# Patient Record
Sex: MALE | Race: WHITE | NOT HISPANIC OR LATINO | Employment: FULL TIME | URBAN - NONMETROPOLITAN AREA
[De-identification: names, ages, dates, MRNs, and addresses within clinical notes are randomized per-mention and may not be internally consistent; named-entity substitution may affect disease eponyms.]

---

## 2017-01-30 ENCOUNTER — OFFICE VISIT (OUTPATIENT)
Dept: FAMILY MEDICINE CLINIC | Facility: CLINIC | Age: 62
End: 2017-01-30

## 2017-01-30 VITALS
HEART RATE: 91 BPM | SYSTOLIC BLOOD PRESSURE: 146 MMHG | WEIGHT: 194 LBS | BODY MASS INDEX: 29.4 KG/M2 | DIASTOLIC BLOOD PRESSURE: 82 MMHG | HEIGHT: 68 IN | TEMPERATURE: 98.8 F

## 2017-01-30 DIAGNOSIS — L98.9 SKIN LESION OF LEFT LOWER EXTREMITY: ICD-10-CM

## 2017-01-30 DIAGNOSIS — J01.00 ACUTE NON-RECURRENT MAXILLARY SINUSITIS: Primary | ICD-10-CM

## 2017-01-30 PROCEDURE — 17110 DESTRUCTION B9 LES UP TO 14: CPT | Performed by: FAMILY MEDICINE

## 2017-01-30 PROCEDURE — 99212 OFFICE O/P EST SF 10 MIN: CPT | Performed by: FAMILY MEDICINE

## 2017-01-30 PROCEDURE — 96372 THER/PROPH/DIAG INJ SC/IM: CPT | Performed by: FAMILY MEDICINE

## 2017-01-30 RX ORDER — TRIAMCINOLONE ACETONIDE 40 MG/ML
40 INJECTION, SUSPENSION INTRA-ARTICULAR; INTRAMUSCULAR ONCE
Status: COMPLETED | OUTPATIENT
Start: 2017-01-30 | End: 2017-01-30

## 2017-01-30 RX ADMIN — TRIAMCINOLONE ACETONIDE 40 MG: 40 INJECTION, SUSPENSION INTRA-ARTICULAR; INTRAMUSCULAR at 13:49

## 2017-01-30 NOTE — PROGRESS NOTES
"Subjective   Chief Complaint   Patient presents with   • Cough     productive, sneezing     Lul Garica is a 61 y.o. male.   Cough (productive, sneezing)    Cough   This is a new problem. The current episode started in the past 7 days. The problem has been gradually worsening. The problem occurs constantly. The cough is productive of sputum. Associated symptoms include headaches, nasal congestion, postnasal drip, rhinorrhea and a sore throat. Pertinent negatives include no ear congestion, ear pain or shortness of breath. The symptoms are aggravated by lying down. He has tried nothing for the symptoms.        The following portions of the patient's history were reviewed and updated as appropriate: allergies, current medications, past family history, past medical history, past social history, past surgical history and problem list.    Review of Systems   HENT: Positive for postnasal drip, rhinorrhea and sore throat. Negative for ear pain.    Respiratory: Positive for cough. Negative for shortness of breath.    Neurological: Positive for headaches.       Objective   Visit Vitals   • /82   • Pulse 91   • Temp 98.8 °F (37.1 °C)   • Ht 68\" (172.7 cm)   • Wt 194 lb (88 kg)   • BMI 29.5 kg/m2     Physical Exam   Constitutional: He is oriented to person, place, and time. He appears well-developed and well-nourished.   HENT:   Head: Normocephalic and atraumatic.   Right Ear: External ear normal.   Left Ear: External ear normal.   Nose: Rhinorrhea present. Right sinus exhibits maxillary sinus tenderness. Right sinus exhibits no frontal sinus tenderness. Left sinus exhibits maxillary sinus tenderness. Left sinus exhibits no frontal sinus tenderness.   Mouth/Throat: Posterior oropharyngeal erythema present.   Eyes: Pupils are equal, round, and reactive to light.   Neck: Normal range of motion. Neck supple.   Cardiovascular: Normal rate, regular rhythm and normal heart sounds.    Pulmonary/Chest: Effort normal and " breath sounds normal.   Neurological: He is alert and oriented to person, place, and time.   Skin: Skin is warm and dry.   Skin lesion on the left patella   Psychiatric: He has a normal mood and affect.   Nursing note and vitals reviewed.      Assessment/Plan   Problems Addressed this Visit     None      Visit Diagnoses     Acute non-recurrent maxillary sinusitis    -  Primary    Relevant Medications    triamcinolone acetonide (KENALOG-40) injection 40 mg (Start on 1/30/2017  2:15 PM)    Skin lesion of left lower extremity            Kenalog IM today    Cryotherapy of left knee skin lesion  Treated twice with good coverage  Patient tolerated procedure well    Recheck as needed

## 2017-01-30 NOTE — MR AVS SNAPSHOT
Lul Garcia   1/30/2017 1:15 PM   Office Visit    Dept Phone:  [phone]   Encounter #:  [id number]    Provider:  Lashanda Olvera MD   Department:  [facility]                Your Full Care Plan              Your Updated Medication List          This list is accurate as of: 1/30/17  1:42 PM.  Always use your most recent med list.                levETIRAcetam 500 MG tablet   Commonly known as:  KEPPRA       lisinopril 20 MG tablet   Commonly known as:  PRINIVIL,ZESTRIL   Take 1 tablet by mouth daily.       OMEGA 3 PO       omeprazole 20 MG capsule   Commonly known as:  priLOSEC       pravastatin 20 MG tablet   Commonly known as:  PRAVACHOL   TAKE ONE TABLET BY MOUTH ONCE DAILY AT BEDTIME               You Were Diagnosed With        Codes Comments    Acute non-recurrent maxillary sinusitis    -  Primary ICD-10-CM: J01.00  ICD-9-CM: 461.0       Medications to be Given to You by a Medical Professional     Due       Frequency    1/30/2017 triamcinolone acetonide (KENALOG-40) injection 40 mg  Once      Instructions     None    Patient Instructions History      Upcoming Appointments     Visit Type Date Time Department    OFFICE VISIT 1/30/2017  1:15 PM [facility]    FOLLOW UP 6/14/2017  3:00 PM [facility]      MyChart Signup     Our records indicate that you have declined [facility]. If you would like to sign up for [facility], please email [email or web address] or call [phone] to obtain an activation code.             Other Info from Your Visit           Your Appointments     Jun 14, 2017  3:00 PM CDT   Follow Up with Lashanda Olvera MD   [facility] [place] (--)    [address] [place] [address] [address] [place] [place] [address]   [phone]           Arrive 15 minutes prior to appointment.              Allergies     No Known Allergies      Reason for Visit     Cough productive, sneezing       "  Vital Signs     Blood Pressure Pulse Temperature Height Weight Body Mass Index    146/82 91 98.8 °F (37.1 °C) 68\" (172.7 cm) 194 lb (88 kg) 29.5 kg/m2    Smoking Status                   Former Smoker           Problems and Diagnoses Noted     Acute non-recurrent maxillary sinusitis    -  Primary        "

## 2017-02-01 ENCOUNTER — TELEPHONE (OUTPATIENT)
Dept: FAMILY MEDICINE CLINIC | Facility: CLINIC | Age: 62
End: 2017-02-01

## 2017-02-01 RX ORDER — BENZONATATE 100 MG/1
100 CAPSULE ORAL 3 TIMES DAILY PRN
Qty: 30 CAPSULE | Refills: 0 | Status: SHIPPED | OUTPATIENT
Start: 2017-02-01 | End: 2017-06-14

## 2017-02-01 NOTE — TELEPHONE ENCOUNTER
----- Message from Lashanda Olvera MD sent at 2/1/2017 12:34 PM CST -----  Sent in script.  ----- Message -----     From: Gabrielle Olvera MA     Sent: 2/1/2017  10:35 AM       To: Lashanda Olvera MD    Please advise  ----- Message -----     From: Nehal Chapin     Sent: 2/1/2017  10:13 AM       To: Gabrielle Olvera MA    Patient says he is still coughing. Wants to know if there is something else he can take or does he need to come in? His number is [phone]

## 2017-02-03 RX ORDER — GUAIFENESIN AND CODEINE PHOSPHATE 100; 10 MG/5ML; MG/5ML
10 SOLUTION ORAL 4 TIMES DAILY PRN
Qty: 236 ML | Refills: 0 | Status: SHIPPED | OUTPATIENT
Start: 2017-02-03 | End: 2017-06-14

## 2017-03-25 ENCOUNTER — HOSPITAL ENCOUNTER (EMERGENCY)
Facility: HOSPITAL | Age: 62
Discharge: HOME | End: 2017-03-25
Attending: EMERGENCY MEDICINE | Admitting: EMERGENCY MEDICINE

## 2017-03-25 ENCOUNTER — APPOINTMENT (OUTPATIENT)
Dept: GENERAL RADIOLOGY | Facility: HOSPITAL | Age: 62
End: 2017-03-25

## 2017-03-25 VITALS
SYSTOLIC BLOOD PRESSURE: 135 MMHG | TEMPERATURE: 99.4 F | DIASTOLIC BLOOD PRESSURE: 82 MMHG | RESPIRATION RATE: 20 BRPM | WEIGHT: 188 LBS | BODY MASS INDEX: 28.49 KG/M2 | OXYGEN SATURATION: 92 % | HEIGHT: 68 IN | HEART RATE: 106 BPM

## 2017-03-25 DIAGNOSIS — K52.9 GASTROENTERITIS: Primary | ICD-10-CM

## 2017-03-25 LAB
ALBUMIN SERPL-MCNC: 4.6 G/DL (ref 3.4–4.8)
ALBUMIN/GLOB SERPL: 1.4 G/DL (ref 1.1–1.8)
ALP SERPL-CCNC: 124 U/L (ref 38–126)
ALT SERPL W P-5'-P-CCNC: 22 U/L (ref 21–72)
ANION GAP SERPL CALCULATED.3IONS-SCNC: 13 MMOL/L (ref 5–15)
AST SERPL-CCNC: 22 U/L (ref 17–59)
BACTERIA UR QL AUTO: ABNORMAL /HPF
BASOPHILS # BLD AUTO: 0.01 10*3/MM3 (ref 0–0.2)
BASOPHILS NFR BLD AUTO: 0.1 % (ref 0–2)
BILIRUB SERPL-MCNC: 0.5 MG/DL (ref 0.2–1.3)
BILIRUB UR QL STRIP: ABNORMAL
BUN BLD-MCNC: 12 MG/DL (ref 7–21)
BUN/CREAT SERPL: 9 (ref 7–25)
CALCIUM SPEC-SCNC: 9.1 MG/DL (ref 8.4–10.2)
CHLORIDE SERPL-SCNC: 107 MMOL/L (ref 95–110)
CLARITY UR: CLEAR
CO2 SERPL-SCNC: 26 MMOL/L (ref 22–31)
COLOR UR: ABNORMAL
CREAT BLD-MCNC: 1.33 MG/DL (ref 0.7–1.3)
D-LACTATE SERPL-SCNC: 1.4 MMOL/L (ref 0.5–2)
DEPRECATED RDW RBC AUTO: 41.8 FL (ref 35.1–43.9)
EOSINOPHIL # BLD AUTO: 0.06 10*3/MM3 (ref 0–0.7)
EOSINOPHIL NFR BLD AUTO: 0.4 % (ref 0–7)
ERYTHROCYTE [DISTWIDTH] IN BLOOD BY AUTOMATED COUNT: 13.6 % (ref 11.5–14.5)
GFR SERPL CREATININE-BSD FRML MDRD: 55 ML/MIN/1.73 (ref 49–113)
GLOBULIN UR ELPH-MCNC: 3.2 GM/DL (ref 2.3–3.5)
GLUCOSE BLD-MCNC: 122 MG/DL (ref 60–100)
GLUCOSE UR STRIP-MCNC: NEGATIVE MG/DL
HCT VFR BLD AUTO: 46.5 % (ref 39–49)
HGB BLD-MCNC: 16.8 G/DL (ref 13.7–17.3)
HGB UR QL STRIP.AUTO: NEGATIVE
HOLD SPECIMEN: NORMAL
HOLD SPECIMEN: NORMAL
HYALINE CASTS UR QL AUTO: ABNORMAL /LPF
IMM GRANULOCYTES # BLD: 0.05 10*3/MM3 (ref 0–0.02)
IMM GRANULOCYTES NFR BLD: 0.4 % (ref 0–0.5)
KETONES UR QL STRIP: ABNORMAL
LEUKOCYTE ESTERASE UR QL STRIP.AUTO: NEGATIVE
LIPASE SERPL-CCNC: 85 U/L (ref 23–300)
LYMPHOCYTES # BLD AUTO: 0.35 10*3/MM3 (ref 0.6–4.2)
LYMPHOCYTES NFR BLD AUTO: 2.6 % (ref 10–50)
MCH RBC QN AUTO: 30.8 PG (ref 26.5–34)
MCHC RBC AUTO-ENTMCNC: 36.1 G/DL (ref 31.5–36.3)
MCV RBC AUTO: 85.3 FL (ref 80–98)
MONOCYTES # BLD AUTO: 0.51 10*3/MM3 (ref 0–0.9)
MONOCYTES NFR BLD AUTO: 3.7 % (ref 0–12)
MUCOUS THREADS URNS QL MICRO: ABNORMAL /HPF
NEUTROPHILS # BLD AUTO: 12.7 10*3/MM3 (ref 2–8.6)
NEUTROPHILS NFR BLD AUTO: 92.8 % (ref 37–80)
NITRITE UR QL STRIP: NEGATIVE
PH UR STRIP.AUTO: 5.5 [PH] (ref 5–9)
PLATELET # BLD AUTO: 168 10*3/MM3 (ref 150–450)
PMV BLD AUTO: 10.3 FL (ref 8–12)
POTASSIUM BLD-SCNC: 4.7 MMOL/L (ref 3.5–5.1)
PROT SERPL-MCNC: 7.8 G/DL (ref 6.3–8.6)
PROT UR QL STRIP: ABNORMAL
RBC # BLD AUTO: 5.45 10*6/MM3 (ref 4.37–5.74)
RBC # UR: ABNORMAL /HPF
REF LAB TEST METHOD: ABNORMAL
SODIUM BLD-SCNC: 146 MMOL/L (ref 137–145)
SP GR UR STRIP: 1.03 (ref 1–1.03)
SQUAMOUS #/AREA URNS HPF: ABNORMAL /HPF
UROBILINOGEN UR QL STRIP: ABNORMAL
WBC NRBC COR # BLD: 13.68 10*3/MM3 (ref 3.2–9.8)
WBC UR QL AUTO: ABNORMAL /HPF
WHOLE BLOOD HOLD SPECIMEN: NORMAL
WHOLE BLOOD HOLD SPECIMEN: NORMAL

## 2017-03-25 PROCEDURE — 74020 HC XR ABDOMEN FLAT & UPRIGHT: CPT

## 2017-03-25 PROCEDURE — 96361 HYDRATE IV INFUSION ADD-ON: CPT

## 2017-03-25 PROCEDURE — 83690 ASSAY OF LIPASE: CPT | Performed by: EMERGENCY MEDICINE

## 2017-03-25 PROCEDURE — 81001 URINALYSIS AUTO W/SCOPE: CPT | Performed by: EMERGENCY MEDICINE

## 2017-03-25 PROCEDURE — 87086 URINE CULTURE/COLONY COUNT: CPT | Performed by: EMERGENCY MEDICINE

## 2017-03-25 PROCEDURE — 80053 COMPREHEN METABOLIC PANEL: CPT | Performed by: EMERGENCY MEDICINE

## 2017-03-25 PROCEDURE — 99284 EMERGENCY DEPT VISIT MOD MDM: CPT

## 2017-03-25 PROCEDURE — 85025 COMPLETE CBC W/AUTO DIFF WBC: CPT | Performed by: EMERGENCY MEDICINE

## 2017-03-25 PROCEDURE — 83605 ASSAY OF LACTIC ACID: CPT | Performed by: EMERGENCY MEDICINE

## 2017-03-25 PROCEDURE — 96360 HYDRATION IV INFUSION INIT: CPT

## 2017-03-25 RX ORDER — SODIUM CHLORIDE 0.9 % (FLUSH) 0.9 %
10 SYRINGE (ML) INJECTION AS NEEDED
Status: DISCONTINUED | OUTPATIENT
Start: 2017-03-25 | End: 2017-03-25 | Stop reason: HOSPADM

## 2017-03-25 RX ORDER — SODIUM CHLORIDE 9 MG/ML
500 INJECTION, SOLUTION INTRAVENOUS ONCE
Status: COMPLETED | OUTPATIENT
Start: 2017-03-25 | End: 2017-03-25

## 2017-03-25 RX ORDER — ONDANSETRON 4 MG/1
4 TABLET, ORALLY DISINTEGRATING ORAL EVERY 6 HOURS PRN
Qty: 12 TABLET | Refills: 0 | Status: SHIPPED | OUTPATIENT
Start: 2017-03-25 | End: 2017-06-14

## 2017-03-25 RX ADMIN — SODIUM CHLORIDE 1000 ML: 9 INJECTION, SOLUTION INTRAVENOUS at 04:50

## 2017-03-25 RX ADMIN — SODIUM CHLORIDE 500 ML: 9 INJECTION, SOLUTION INTRAVENOUS at 02:58

## 2017-03-25 RX ADMIN — SODIUM CHLORIDE 500 ML: 9 INJECTION, SOLUTION INTRAVENOUS at 07:28

## 2017-03-25 NOTE — DISCHARGE INSTRUCTIONS
Followup with your doctor in three days if not better.  Return with any new or worsening symptoms, or any concerns.

## 2017-03-25 NOTE — ED PROVIDER NOTES
Subjective   HPI Comments: Patient with new onset abdominal discomfort, cramping, copious diarrhea 'all night', and emesis x 1.  Patient relates this to eating at [employer] in [place] today.  Patient does have a hx of bowel obstruction but no symptoms of the same today.  No f/c.  No recent illnesses.      Patient is a 61 y.o. male presenting with abdominal pain.   History provided by:  Patient   used: No    Abdominal Pain   Pain location:  Generalized  Pain quality: aching and cramping    Pain radiates to:  Does not radiate  Pain severity:  Mild  Onset quality:  Gradual  Duration:  8 hours  Timing:  Intermittent  Progression:  Waxing and waning  Chronicity:  New  Context: suspicious food intake    Relieved by:  Vomiting  Associated symptoms: anorexia, diarrhea, nausea and vomiting    Associated symptoms: no belching, no chest pain, no chills, no constipation, no cough, no dysuria, no fatigue, no fever, no flatus, no hematemesis, no hematuria, no melena, no shortness of breath and no sore throat        Review of Systems   Constitutional: Negative.  Negative for appetite change, chills, fatigue and fever.   HENT: Negative.  Negative for congestion and sore throat.    Eyes: Negative.  Negative for photophobia and visual disturbance.   Respiratory: Negative.  Negative for cough, chest tightness and shortness of breath.    Cardiovascular: Negative.  Negative for chest pain and palpitations.   Gastrointestinal: Positive for abdominal pain, anorexia, diarrhea, nausea and vomiting. Negative for constipation, flatus, hematemesis and melena.   Endocrine: Negative.    Genitourinary: Negative.  Negative for decreased urine volume, dysuria, flank pain and hematuria.   Musculoskeletal: Negative.  Negative for arthralgias, back pain, myalgias, neck pain and neck stiffness.   Skin: Negative.  Negative for pallor.   Neurological: Negative.  Negative for dizziness, syncope, weakness, light-headedness, numbness  "and headaches.   Psychiatric/Behavioral: Negative.  Negative for confusion and suicidal ideas. The patient is not nervous/anxious.    All other systems reviewed and are negative.      Past Medical History:   Diagnosis Date   • Acute hepatitis     Acute hepatitis - resolved      • Anemia     Anemia, unspecified      • Blood clots in brain    • Body mass index (BMI) of 28.0-28.9 in adult    • Body mass index (BMI) of 30.0-30.9 in adult    • Dizziness     Dizziness - resolving      • GERD (gastroesophageal reflux disease)    • Hypercholesterolemia    • Hypertensive disorder    • Regional enteritis of small intestine    • Simple partial seizure with autonomic dysfunction        No Known Allergies    Past Surgical History:   Procedure Laterality Date   • APPENDECTOMY     • BRAIN SURGERY      x4, last surgery was in 1999.   • CARDIAC CATHETERIZATION     • CHOLECYSTECTOMY     • COLON RESECTION     • ENDOSCOPY AND COLONOSCOPY       Internal & external hemorrhoids found. 5/29/2013      • ENDOSCOPY W/ PEG TUBE PLACEMENT      Normal esophagus. Gastritis in stomach. Biopsy taken. Normal duodenum. Biopsy taken. 5/29/2013       • HYDROCELE EXCISION / REPAIR      Normal esophagus. Gastritis in stomach. Biopsy taken. Normal duodenum. Biopsy taken. 5/29/2013       • INJECTION OF MEDICATION      Celestone (betamethasone) (1)      02/04/2016    • INJECTION OF MEDICATION      Depo Medrol (Methylprednisone) 80mg (1)      09/30/2013       • NASAL SEPTUM SURGERY         Family History   Problem Relation Age of Onset   • Cancer Father    • Other Father      Hematologic disorder   • Leukemia Father    • Diabetes Brother        Social History     Social History   • Marital status:      Spouse name: N/A   • Number of children: N/A   • Years of education: N/A     Social History Main Topics   • Smoking status: Former Smoker     Types: Cigarettes   • Smokeless tobacco: Never Used      Comment: Quit smoking about 26 years ago\".   • Alcohol " "use No      Comment: \"Former Beer drinker, Quit about 26 years ago\"   • Drug use: No   • Sexual activity: Not Asked     Other Topics Concern   • None     Social History Narrative           Objective   Physical Exam   Constitutional: He is oriented to person, place, and time. He appears well-developed and well-nourished. No distress.   HENT:   Head: Normocephalic and atraumatic.   Eyes: Conjunctivae and EOM are normal.   Neck: Normal range of motion. Neck supple. No JVD present.   Cardiovascular: Normal rate, regular rhythm, normal heart sounds and intact distal pulses.  Exam reveals no gallop and no friction rub.    No murmur heard.  Pulmonary/Chest: Effort normal. No respiratory distress. He has no wheezes. He has no rales. He exhibits no tenderness.   Abdominal: Soft. Bowel sounds are normal. He exhibits no distension and no mass. There is no tenderness. There is no rebound and no guarding.   Musculoskeletal: Normal range of motion.   Neurological: He is alert and oriented to person, place, and time.   Skin: Skin is warm and dry.   Psychiatric: He has a normal mood and affect. His behavior is normal. Judgment and thought content normal.   Nursing note and vitals reviewed.      Procedures         ED Course  ED Course        Labs Reviewed   COMPREHENSIVE METABOLIC PANEL - Abnormal; Notable for the following:        Result Value    Glucose 122 (*)     Creatinine 1.33 (*)     Sodium 146 (*)     All other components within normal limits   URINALYSIS W/ CULTURE IF INDICATED - Abnormal; Notable for the following:     Specific Gravity, UA 1.031 (*)     Ketones, UA Trace (*)     Bilirubin, UA Small (1+) (*)     Protein, UA 30 mg/dL (1+) (*)     All other components within normal limits   CBC WITH AUTO DIFFERENTIAL - Abnormal; Notable for the following:     WBC 13.68 (*)     Neutrophil % 92.8 (*)     Lymphocyte % 2.6 (*)     Neutrophils, Absolute 12.70 (*)     Lymphocytes, Absolute 0.35 (*)     Immature Grans, Absolute 0.05 " (*)     All other components within normal limits   URINALYSIS, MICROSCOPIC ONLY - Abnormal; Notable for the following:     RBC, UA 3-5 (*)     WBC, UA 6-12 (*)     Bacteria, UA 2+ (*)     Mucus, UA Moderate/2+ (*)     All other components within normal limits   URINE CULTURE - Normal   LIPASE - Normal   LACTIC ACID, PLASMA - Normal   RAINBOW DRAW    Narrative:     The following orders were created for panel order [place] Draw.  Procedure                               Abnormality         Status                     ---------                               -----------         ------                     Light Blue Top[[id number]]                                    Final result               Green Top (Gel)[[id number]]                                   Final result               Lavender Top[[id number]]                                      Final result               Gold Top - SST[[id number]]                                    Final result                 Please view results for these tests on the individual orders.   CBC AND DIFFERENTIAL    Narrative:     The following orders were created for panel order CBC & Differential.  Procedure                               Abnormality         Status                     ---------                               -----------         ------                     CBC Auto Differential[[id number]]         Abnormal            Final result                 Please view results for these tests on the individual orders.   LIGHT BLUE TOP   GREEN TOP   LAVENDER TOP   GOLD TOP - SST       XR Abdomen Flat & Upright   Final Result   1. Nonspecific bowel gas pattern.   2. Postsurgical changes.      Electronically signed by:  Ashish Dalton MD  3/25/2017 3:57 AM   CDT Workstation: [id number]        Improved with ED interventions.  Symptomatic care.            [facility]    Final diagnoses:   Gastroenteritis            Tobias Miller MD  03/26/17 0711

## 2017-03-26 LAB — BACTERIA SPEC AEROBE CULT: NORMAL

## 2017-04-04 DIAGNOSIS — I10 ESSENTIAL HYPERTENSION: ICD-10-CM

## 2017-04-04 RX ORDER — PRAVASTATIN SODIUM 20 MG
20 TABLET ORAL DAILY
Qty: 90 TABLET | Refills: 0 | Status: SHIPPED | OUTPATIENT
Start: 2017-04-04 | End: 2017-06-16 | Stop reason: SDUPTHER

## 2017-04-04 RX ORDER — LISINOPRIL 20 MG/1
20 TABLET ORAL DAILY
Qty: 30 TABLET | Refills: 5 | Status: SHIPPED | OUTPATIENT
Start: 2017-04-04 | End: 2017-04-28 | Stop reason: SDUPTHER

## 2017-04-28 DIAGNOSIS — I10 ESSENTIAL HYPERTENSION: ICD-10-CM

## 2017-04-28 RX ORDER — LISINOPRIL 20 MG/1
20 TABLET ORAL DAILY
Qty: 30 TABLET | Refills: 5 | Status: SHIPPED | OUTPATIENT
Start: 2017-04-28 | End: 2017-06-16 | Stop reason: SDUPTHER

## 2017-05-24 RX ORDER — OMEPRAZOLE 20 MG/1
CAPSULE, DELAYED RELEASE ORAL
Qty: 30 CAPSULE | Refills: 0 | Status: SHIPPED | OUTPATIENT
Start: 2017-05-24 | End: 2017-06-16 | Stop reason: SDUPTHER

## 2017-06-12 ENCOUNTER — LAB (OUTPATIENT)
Dept: LAB | Facility: OTHER | Age: 62
End: 2017-06-12

## 2017-06-12 DIAGNOSIS — E78.2 MIXED HYPERLIPIDEMIA: Chronic | ICD-10-CM

## 2017-06-12 DIAGNOSIS — I10 ESSENTIAL HYPERTENSION: Primary | Chronic | ICD-10-CM

## 2017-06-12 DIAGNOSIS — I10 ESSENTIAL HYPERTENSION: ICD-10-CM

## 2017-06-12 LAB
ALBUMIN SERPL-MCNC: 4 G/DL (ref 3.2–5.5)
ALBUMIN/GLOB SERPL: 1.4 G/DL (ref 1–3)
ALP SERPL-CCNC: 94 U/L (ref 15–121)
ALT SERPL W P-5'-P-CCNC: 10 U/L (ref 10–60)
ANION GAP SERPL CALCULATED.3IONS-SCNC: 7 MMOL/L (ref 5–15)
AST SERPL-CCNC: 17 U/L (ref 10–60)
BASOPHILS # BLD AUTO: 0.03 10*3/MM3 (ref 0–0.2)
BASOPHILS NFR BLD AUTO: 0.4 % (ref 0–2)
BILIRUB SERPL-MCNC: 0.5 MG/DL (ref 0.2–1)
BUN BLD-MCNC: 9 MG/DL (ref 8–25)
BUN/CREAT SERPL: 8.2 (ref 7–25)
CALCIUM SPEC-SCNC: 9 MG/DL (ref 8.4–10.8)
CHLORIDE SERPL-SCNC: 109 MMOL/L (ref 100–112)
CHOLEST SERPL-MCNC: 219 MG/DL (ref 150–200)
CO2 SERPL-SCNC: 28 MMOL/L (ref 20–32)
CREAT BLD-MCNC: 1.1 MG/DL (ref 0.4–1.3)
DEPRECATED RDW RBC AUTO: 42.5 FL (ref 35.1–43.9)
EOSINOPHIL # BLD AUTO: 0.16 10*3/MM3 (ref 0–0.7)
EOSINOPHIL NFR BLD AUTO: 2.3 % (ref 0–7)
ERYTHROCYTE [DISTWIDTH] IN BLOOD BY AUTOMATED COUNT: 13.7 % (ref 11.5–14.5)
GFR SERPL CREATININE-BSD FRML MDRD: 68 ML/MIN/1.73 (ref 49–113)
GLOBULIN UR ELPH-MCNC: 2.9 GM/DL (ref 2.5–4.6)
GLUCOSE BLD-MCNC: 95 MG/DL (ref 70–100)
HCT VFR BLD AUTO: 42.4 % (ref 39–49)
HDLC SERPL-MCNC: 41 MG/DL (ref 35–100)
HGB BLD-MCNC: 14.5 G/DL (ref 13.7–17.3)
LDLC SERPL CALC-MCNC: 151 MG/DL
LDLC/HDLC SERPL: 3.67 {RATIO}
LYMPHOCYTES # BLD AUTO: 2.44 10*3/MM3 (ref 0.6–4.2)
LYMPHOCYTES NFR BLD AUTO: 35.7 % (ref 10–50)
MCH RBC QN AUTO: 29.7 PG (ref 26.5–34)
MCHC RBC AUTO-ENTMCNC: 34.2 G/DL (ref 31.5–36.3)
MCV RBC AUTO: 86.9 FL (ref 80–98)
MONOCYTES # BLD AUTO: 0.39 10*3/MM3 (ref 0–0.9)
MONOCYTES NFR BLD AUTO: 5.7 % (ref 0–12)
NEUTROPHILS # BLD AUTO: 3.82 10*3/MM3 (ref 2–8.6)
NEUTROPHILS NFR BLD AUTO: 55.9 % (ref 37–80)
PLATELET # BLD AUTO: 195 10*3/MM3 (ref 150–450)
PMV BLD AUTO: 10.4 FL (ref 8–12)
POTASSIUM BLD-SCNC: 4.2 MMOL/L (ref 3.4–5.4)
PROT SERPL-MCNC: 6.9 G/DL (ref 6.7–8.2)
RBC # BLD AUTO: 4.88 10*6/MM3 (ref 4.37–5.74)
SODIUM BLD-SCNC: 144 MMOL/L (ref 134–146)
TRIGL SERPL-MCNC: 137 MG/DL (ref 35–160)
VLDLC SERPL-MCNC: 27.4 MG/DL
WBC NRBC COR # BLD: 6.84 10*3/MM3 (ref 3.2–9.8)

## 2017-06-12 PROCEDURE — 36415 COLL VENOUS BLD VENIPUNCTURE: CPT | Performed by: FAMILY MEDICINE

## 2017-06-12 PROCEDURE — 80053 COMPREHEN METABOLIC PANEL: CPT | Performed by: FAMILY MEDICINE

## 2017-06-12 PROCEDURE — 85025 COMPLETE CBC W/AUTO DIFF WBC: CPT | Performed by: FAMILY MEDICINE

## 2017-06-12 PROCEDURE — 84439 ASSAY OF FREE THYROXINE: CPT | Performed by: FAMILY MEDICINE

## 2017-06-12 PROCEDURE — 84443 ASSAY THYROID STIM HORMONE: CPT | Performed by: FAMILY MEDICINE

## 2017-06-12 PROCEDURE — 80061 LIPID PANEL: CPT | Performed by: FAMILY MEDICINE

## 2017-06-13 LAB
T4 FREE SERPL-MCNC: 0.73 NG/DL (ref 0.78–2.19)
TSH SERPL DL<=0.05 MIU/L-ACNC: 2.01 MIU/ML (ref 0.46–4.68)

## 2017-06-14 ENCOUNTER — OFFICE VISIT (OUTPATIENT)
Dept: FAMILY MEDICINE CLINIC | Facility: CLINIC | Age: 62
End: 2017-06-14

## 2017-06-14 VITALS
DIASTOLIC BLOOD PRESSURE: 90 MMHG | HEART RATE: 103 BPM | HEIGHT: 68 IN | SYSTOLIC BLOOD PRESSURE: 158 MMHG | WEIGHT: 188 LBS | BODY MASS INDEX: 28.49 KG/M2 | TEMPERATURE: 96.1 F | OXYGEN SATURATION: 97 %

## 2017-06-14 DIAGNOSIS — K21.9 GASTROESOPHAGEAL REFLUX DISEASE WITHOUT ESOPHAGITIS: ICD-10-CM

## 2017-06-14 DIAGNOSIS — I10 ESSENTIAL HYPERTENSION: Chronic | ICD-10-CM

## 2017-06-14 DIAGNOSIS — E78.2 MIXED HYPERLIPIDEMIA: Chronic | ICD-10-CM

## 2017-06-14 DIAGNOSIS — K59.1 FUNCTIONAL DIARRHEA: ICD-10-CM

## 2017-06-14 DIAGNOSIS — Z00.00 ENCOUNTER FOR MEDICARE ANNUAL WELLNESS EXAM: Primary | ICD-10-CM

## 2017-06-14 PROCEDURE — G0438 PPPS, INITIAL VISIT: HCPCS | Performed by: FAMILY MEDICINE

## 2017-06-14 PROCEDURE — 99214 OFFICE O/P EST MOD 30 MIN: CPT | Performed by: FAMILY MEDICINE

## 2017-06-14 NOTE — PROGRESS NOTES
"QUICK REFERENCE INFORMATION:  The ABCs of the Annual Wellness Visit    Initial Medicare Wellness Visit    HEALTH RISK ASSESSMENT    1955    Recent Hospitalizations:  No hospitalization(s) within the last year..        Current Medical Providers:  Patient Care Team:  Lashanda Olvera MD as PCP - General        Smoking Status:  History   Smoking Status   • Former Smoker   • Types: Cigarettes   Smokeless Tobacco   • Never Used     Comment: Quit smoking about 26 years ago\".       Alcohol Consumption:  History   Alcohol Use No     Comment: \"Former Beer drinker, Quit about 26 years ago\"       Depression Screen:   PHQ-9 Depression Screening 6/14/2017   Little interest or pleasure in doing things 0   Feeling down, depressed, or hopeless 1   Trouble falling or staying asleep, or sleeping too much 0   Feeling tired or having little energy 1   Poor appetite or overeating 0   Feeling bad about yourself - or that you are a failure or have let yourself or your family down 0   Trouble concentrating on things, such as reading the newspaper or watching television 0   Moving or speaking so slowly that other people could have noticed. Or the opposite - being so fidgety or restless that you have been moving around a lot more than usual 0   Thoughts that you would be better off dead, or of hurting yourself in some way 0   PHQ-9 Total Score 2   If you checked off any problems, how difficult have these problems made it for you to do your work, take care of things at home, or get along with other people? -       Health Habits and Functional and Cognitive Screening:  Functional & Cognitive Status 6/14/2017   Do you have difficulty preparing food and eating? No   Do you have difficulty bathing yourself? No   Do you have difficulty getting dressed? No   Do you have difficulty using the toilet? No   Do you have difficulty moving around from place to place? No   In the past year have you fallen or experienced a near fall? No   Do you " need help using the phone?  No   Are you deaf or do you have serious difficulty hearing?  No   Do you need help with transportation? No   Do you need help shopping? No   Do you need help preparing meals?  No   Do you need help with housework?  No   Do you need help with laundry? No   Do you need help taking your medications? No   Do you need help managing money? No   Do you have difficulty concentrating, remembering or making decisions? Yes       Health Habits  Current Diet: Unhealthy Diet  Dental Exam: Not up to date  Eye Exam: Up to date  Exercise (times per week): 0 times per week    Does the patient have evidence of cognitive impairment? No    Asiprin use counseling: Start ASA 81 mg daily       Recent Lab Results:    Visual Acuity:  No exam data present    Age-appropriate Screening Schedule:  Refer to the list below for future screening recommendations based on patient's age, sex and/or medical conditions. Orders for these recommended tests are listed in the plan section. The patient has been provided with a written plan.    Health Maintenance   Topic Date Due   • INFLUENZA VACCINE  08/01/2017   • LIPID PANEL  06/12/2018   • COLONOSCOPY  05/29/2023   • TDAP/TD VACCINES (2 - Td) 03/15/2025   • ZOSTER VACCINE  Completed        Subjective   History of Present Illness    Lul Garcia is a 62 y.o. male who presents for an Annual Wellness Visit.    The following portions of the patient's history were reviewed and updated as appropriate: allergies, current medications, past family history, past medical history, past social history, past surgical history and problem list.    Outpatient Medications Prior to Visit   Medication Sig Dispense Refill   • levETIRAcetam (KEPPRA) 500 MG tablet Take 500 mg by mouth 2 (two) times a day.     • lisinopril (PRINIVIL,ZESTRIL) 20 MG tablet Take 1 tablet by mouth Daily. 30 tablet 5   • Omega-3 Fatty Acids (OMEGA 3 PO) Take  by mouth.     • omeprazole (priLOSEC) 20 MG capsule TAKE  "ONE CAPSULE BY MOUTH ONCE DAILY 30 capsule 0   • pravastatin (PRAVACHOL) 20 MG tablet Take 1 tablet by mouth Daily. 90 tablet 0   • benzonatate (TESSALON PERLES) 100 MG capsule Take 1 capsule by mouth 3 (Three) Times a Day As Needed for cough. 30 capsule 0   • guaifenesin-codeine (GUAIFENESIN AC) 100-10 MG/5ML liquid Take 10 mL by mouth 4 (Four) Times a Day As Needed for cough. 236 mL 0   • ondansetron ODT (ZOFRAN-ODT) 4 MG disintegrating tablet Take 1 tablet by mouth Every 6 (Six) Hours As Needed for Nausea or Vomiting. 12 tablet 0     No facility-administered medications prior to visit.        Patient Active Problem List   Diagnosis   • Essential hypertension   • Mixed hyperlipidemia       Advance Care Planning:  has NO advance directive - information provided to the patient today    Identification of Risk Factors:  Risk factors include: cardiovascular risk, inactivity and inadequate social support.    Review of Systems    Compared to one year ago, the patient feels his physical health is the same.  Compared to one year ago, the patient feels his mental health is the same.    Objective     Physical Exam    Vitals:    06/14/17 1453   BP: 158/90   Pulse: 103   Temp: 96.1 °F (35.6 °C)   SpO2: 97%   Weight: 188 lb (85.3 kg)   Height: 68\" (172.7 cm)       Body mass index is 28.59 kg/(m^2).  Discussed the patient's BMI with him. The BMI is above average; BMI management plan is completed.    Assessment/Plan   Patient Self-Management and Personalized Health Advice  The patient has been provided with information about: diet, exercise, weight management, prevention of cardiac or vascular disease and designing advance directives and preventive services including:   · Advance directive.    Visit Diagnoses:    ICD-10-CM ICD-9-CM   1. Essential hypertension I10 401.9   2. Mixed hyperlipidemia E78.2 272.2   3. Functional diarrhea K59.1 564.5       No orders of the defined types were placed in this encounter.      Outpatient " Encounter Prescriptions as of 6/14/2017   Medication Sig Dispense Refill   • levETIRAcetam (KEPPRA) 500 MG tablet Take 500 mg by mouth 2 (two) times a day.     • lisinopril (PRINIVIL,ZESTRIL) 20 MG tablet Take 1 tablet by mouth Daily. 30 tablet 5   • Omega-3 Fatty Acids (OMEGA 3 PO) Take  by mouth.     • omeprazole (priLOSEC) 20 MG capsule TAKE ONE CAPSULE BY MOUTH ONCE DAILY 30 capsule 0   • pravastatin (PRAVACHOL) 20 MG tablet Take 1 tablet by mouth Daily. 90 tablet 0   • [DISCONTINUED] benzonatate (TESSALON PERLES) 100 MG capsule Take 1 capsule by mouth 3 (Three) Times a Day As Needed for cough. 30 capsule 0   • [DISCONTINUED] guaifenesin-codeine (GUAIFENESIN AC) 100-10 MG/5ML liquid Take 10 mL by mouth 4 (Four) Times a Day As Needed for cough. 236 mL 0   • [DISCONTINUED] ondansetron ODT (ZOFRAN-ODT) 4 MG disintegrating tablet Take 1 tablet by mouth Every 6 (Six) Hours As Needed for Nausea or Vomiting. 12 tablet 0     No facility-administered encounter medications on file as of 6/14/2017.        Reviewed use of high risk medication in the elderly: yes  Reviewed for potential of harmful drug interactions in the elderly: yes    Follow Up:  Return in about 6 months (around 12/14/2017), or if symptoms worsen or fail to improve.     An After Visit Summary and PPPS with all of these plans were given to the patient.

## 2017-06-14 NOTE — PROGRESS NOTES
Subjective   Chief Complaint   Patient presents with   • Follow-up     6 month follow up     Lul Garcia is a 62 y.o. male.   Follow-up (6 month follow up)    History of Present Illness     Hyperlipidemia   This is a chronic problem. The problem is uncontrolled. Recent lipid tests were reviewed and are high. Factors aggravating his hyperlipidemia include fatty foods. Pertinent negatives include no chest pain or shortness of breath. Current antihyperlipidemic treatment includes statins and diet change. Compliance problems include adherence to diet.  Risk factors for coronary artery disease include male sex, hypertension, stress and a sedentary lifestyle.   currently prescribed pravastatin and fish oil - patient admits to not taking pravastatin  Last lipid panel done June 2017- total cholesterol and LDL elevated above goal  TG and HDL are good    Hypertension   This is a chronic problem. The current episode started more than 1 year ago. The problem has been gradually worsening since onset. The problem is controlled. Pertinent negatives include no blurred vision, chest pain, headaches, neck pain, palpitations or shortness of breath. There are no associated agents to hypertension. Risk factors for coronary artery disease include sedentary lifestyle, male gender, family history and dyslipidemia. Past treatments include ACE inhibitors. The current treatment provides mild improvement. There are no compliance problems.    currently taking lisinopril 20mg but patient admits to taking this medication in the late afternoon.    H/o brain tumor followed by [facility] every 6 months  Preventative measures for seizures - patient maintained on keppra  Levels are monitored by [facility]  Diagnosed with pre-syncope.  CT nml, cardiac enzymes wnl.  Has had brain surgery four times.  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  Grossly stable size compared to prior studies of 2014 and 2007; prior Right craniotomies; no acute intracranial abnormality evident; Carotid doppler--no physiologically significant elevated flow velocities; mild heterogeneous plaque in the common  carotid bifurcation( reports scanned into chart).     gerd - controlled with prilosec    C/o daily diarrhea  Does not have a diet high in fiber  Does not use fiber supplement    The following portions of the patient's history were reviewed and updated as appropriate: allergies, current medications, past family history, past medical history, past social history, past surgical history and problem list.    Review of Systems   Constitutional: Negative for appetite change, chills, fatigue and fever.   HENT: Negative for congestion, ear pain, rhinorrhea and sore throat.    Eyes: Negative for pain.   Respiratory: Negative for cough and shortness of breath.    Cardiovascular: Negative for chest pain and palpitations.   Gastrointestinal: Positive for diarrhea. Negative for abdominal pain, constipation and nausea.   Genitourinary: Negative for dysuria.   Musculoskeletal: Negative for back pain, joint swelling and neck pain.   Skin: Negative for rash.   Neurological: Negative for dizziness and headaches.       Objective   /90  Pulse 103  Temp 96.1 °F (35.6 °C)  Ht 68\" (172.7 cm)  Wt 188 lb (85.3 kg)  SpO2 97%  BMI 28.59 kg/m2  Physical Exam   Constitutional: He is oriented to person, place, and time. He appears well-developed and well-nourished.   HENT:   Head: Normocephalic and atraumatic.   Eyes: Pupils are equal, round, and reactive to light.   Neck: Normal range of motion. Neck supple.   Cardiovascular: Normal rate, regular rhythm and normal heart sounds.    Pulmonary/Chest: Effort normal and breath sounds normal. No respiratory distress. He has no wheezes. He has no rales. "   Abdominal: Soft. Bowel sounds are normal.   Musculoskeletal: Normal range of motion.   Neurological: He is alert and oriented to person, place, and time.   Skin: Skin is warm and dry.   Psychiatric: He has a normal mood and affect.   Nursing note and vitals reviewed.      Assessment/Plan   Problems Addressed this Visit        Cardiovascular and Mediastinum    Essential hypertension (Chronic)    Mixed hyperlipidemia (Chronic)       Digestive    Gastroesophageal reflux disease without esophagitis    Functional diarrhea      Other Visit Diagnoses     Encounter for Medicare annual wellness exam    -  Primary        Reviewed lab results with patient  Will send in copy of results to neurologist    Restart pravastatin    Patient instructed to take lisinopril in the morning for bp    Will repeat labs in 6 months    Medicare wellness exam done today    Start dietary fiber supplement daily    Recheck in 6 months

## 2017-06-14 NOTE — PATIENT INSTRUCTIONS
Chronic Diarrhea  Diarrhea is frequent loose and watery bowel movements. It can cause you to feel weak and dehydrated. Dehydration can cause you to become tired and thirsty and to have a dry mouth, decreased urination, and dark yellow urine. Diarrhea is a sign of another problem, most often an infection that will not last long. In most cases, diarrhea lasts 2-3 days. Diarrhea that lasts longer than 4 weeks is called long-lasting (chronic) diarrhea. It is important to treat your diarrhea as directed by your health care provider to lessen or prevent future episodes of diarrhea.   CAUSES   There are many causes of chronic diarrhea. The following are some possible causes:   · Gastrointestinal infections caused by viruses, bacteria, or parasites.    · Food poisoning or food allergies.    · Certain medicines, such as antibiotics, chemotherapy, and laxatives.    · Artificial sweeteners and fructose.    · Digestive disorders, such as celiac disease and inflammatory bowel diseases.    · Irritable bowel syndrome.  · Some disorders of the pancreas.  · Disorders of the thyroid.  · Reduced blood flow to the intestines.  · Cancer.  Sometimes the cause of chronic diarrhea is unknown.  RISK FACTORS  · Having a severely weakened immune system, such as from HIV or AIDS.    · Taking certain types of cancer-fighting drugs (such as with chemotherapy) or other medicines.    · Having had a recent organ transplant.    · Having a portion of the stomach or small bowel removed.    · Traveling to countries where food and water supplies are often contaminated.    SYMPTOMS   In addition to frequent, loose stools, diarrhea may cause:   · Cramping.    · Abdominal pain.    · Nausea.    · Fever.  · Fatigue.  · Urgent need to use the bathroom.  · Loss of bowel control.  DIAGNOSIS   Your health care provider must take a careful history and perform a physical exam. Tests given are based on your symptoms and history. Tests may include:   · Blood or  stool tests. Three or more stool samples may be examined. Stool cultures may be used to test for bacteria or parasites.    · X-rays.    · A procedure in which a thin tube is inserted into the mouth or rectum (endoscopy). This allows the health care provider to look inside the intestine.    TREATMENT   · Treatment is aimed at correcting the cause of the diarrhea when possible.  · Diarrhea caused by an infection can often be treated with antibiotic medicines.  · Diarrhea not caused by an infection may require you to take long-term medicine or have surgery. Specific treatment should be discussed with your health care provider.  · If the cause cannot be determined, treatment aims to relieve symptoms and prevent dehydration. Serious health problems can occur if you do not maintain proper fluid levels. Treatment may include:    Taking an oral rehydration solution (ORS).    Not drinking beverages that contain caffeine (such as tea, coffee, and soft drinks).    Not drinking alcohol.    Maintaining well-balanced nutrition to help you recover faster.  HOME CARE INSTRUCTIONS   · Drink enough fluids to keep urine clear or pale yellow. Drink 1 cup (8 oz) of fluid for each diarrhea episode. Avoid fluids that contain simple sugars, fruit juices, whole milk products, and sodas. Hydrate with an ORS. You may purchase the ORS or prepare it at home by mixing the following ingredients together:    ?-? tsp (1.7-3 ? mL) table salt.    ¾ tsp (3 ¾ mL) baking soda.    ? tsp (1.7 mL) salt substitute containing potassium chloride.    1 ? tbsp (20 mL) sugar.    4.2 c (1 L) of water.    · Certain foods and beverages may increase the speed at which food moves through the gastrointestinal (GI) tract. These foods and beverages should be avoided. They include:    Caffeinated and alcoholic beverages.    High-fiber foods, such as raw fruits and vegetables, nuts, seeds, and whole grain breads and cereals.    Foods and beverages sweetened with sugar  alcohols, such as xylitol, sorbitol, and mannitol.    · Some foods may be well tolerated and may help thicken stool. These include:    Starchy foods, such as rice, toast, pasta, low-sugar cereal, oatmeal, grits, baked potatoes, crackers, and bagels.    Bananas.    Applesauce.  · Add probiotic-rich foods to help increase healthy bacteria in the GI tract. These include yogurt and fermented milk products.  · Wash your hands well after each diarrhea episode.  · Only take over-the-counter or prescription medicines as directed by your health care provider.  · Take a warm bath to relieve any burning or pain from frequent diarrhea episodes.  SEEK MEDICAL CARE IF:   · You are not urinating as often.  · Your urine is a dark color.  · You become very tired or dizzy.  · You have severe pain in the abdomen or rectum.  · Your have blood or pus in your stools.  · Your stools look black and tarry.  SEEK IMMEDIATE MEDICAL CARE IF:   · You are unable to keep fluids down.  · You have persistent vomiting.  · You have blood in your stool.  · Your stools are black and tarry.  · You do not urinate in 6-8 hours, or there is only a small amount of very dark urine.  · You have abdominal pain that increases or localizes.  · You have weakness, dizziness, confusion, or lightheadedness.  · You have a severe headache.  · Your diarrhea gets worse or does not get better.  · You have a fever or persistent symptoms for more than 2-3 days.  · You have a fever and your symptoms suddenly get worse.  MAKE SURE YOU:   · Understand these instructions.  · Will watch your condition.  · Will get help right away if you are not doing well or get worse.     This information is not intended to replace advice given to you by your health care provider. Make sure you discuss any questions you have with your health care provider.     Document Released: 03/09/2005 Document Revised: 12/23/2014 Document Reviewed: 06/12/2014  [employer] Interactive Patient Education ©2017  [employer]  Advance Directive  Advance directives are the legal documents that allow you to make choices about your health care and medical treatment if you cannot speak for yourself. Advance directives are a way for you to communicate your wishes to family, friends, and health care providers. The specified people can then convey your decisions about end-of-life care to avoid confusion if you should become unable to communicate.  Ideally, the process of discussing and writing advance directives should happen over time rather than making decisions all at once. Advance directives can be modified as your situation changes, and you can change your mind at any time, even after you have signed the advance directives. Each state has its own laws regarding advance directives.  You may want to check with your health care provider, , or state representative about the law in your state. Below are some examples of advance directives.  HEALTH CARE PROXY AND DURABLE POWER OF  FOR HEALTH CARE  A health care proxy is a person (agent) appointed to make medical decisions for you if you cannot. Generally, people choose someone they know well and trust to represent their preferences when they can no longer do so. You should be sure to ask this person for agreement to act as your agent. An agent may have to exercise judgment in the event of a medical decision for which your wishes are not known.  A durable power of  for health care is a legal document that names your health care proxy. Depending on the laws in your state, after the document is written, it may also need to be:  · Signed.  · Notarized.  · Dated.  · Copied.  · Witnessed.  · Incorporated into your medical record.  You may also want to appoint someone to manage your financial affairs if you cannot. This is called a durable power of  for finances. It is a separate legal document from the durable power of  for health care. You may  choose the same person or someone different from your health care proxy to act as your agent in financial matters.  LIVING WILL  A living will is a set of instructions documenting your wishes about medical care when you cannot care for yourself. It is used if you become:  · Terminally ill.  · Incapacitated.  · Unable to communicate.  · Unable to make decisions.  Items to consider in your living will include:  · The use or non-use of life-sustaining equipment, such as dialysis machines and breathing machines (ventilators).  · A do not resuscitate (DNR) order, which is the instruction not to use cardiopulmonary resuscitation (CPR) if breathing or heartbeat stops.  · Tube feeding.  · Withholding of food and fluids.  · Comfort (palliative) care when the goal becomes comfort rather than a cure.  · Organ and tissue donation.  A living will does not give instructions about distribution of your money and property if you should pass away. It is advisable to seek the expert advice of a  in drawing up a will regarding your possessions. Decisions about taxes, beneficiaries, and asset distribution will be legally binding. This process can relieve your family and friends of any burdens surrounding disputes or questions that may come up about the allocation of your assets.  DO NOT RESUSCITATE (DNR)  A do not resuscitate (DNR) order is a request to not have CPR in the event that your heart stops beating or you stop breathing. Unless given other instructions, a health care provider will try to help any patient whose heart has stopped or who has stopped breathing.      This information is not intended to replace advice given to you by your health care provider. Make sure you discuss any questions you have with your health care provider.     Document Released: 03/26/2009 Document Revised: 04/10/2017 Document Reviewed: 05/07/2014  [employer] Interactive Patient Education ©2017 [employer]    Exercising to Lose Weight  Exercising  can help you to lose weight. In order to lose weight through exercise, you need to do vigorous-intensity exercise. You can tell that you are exercising with vigorous intensity if you are breathing very hard and fast and cannot hold a conversation while exercising.  Moderate-intensity exercise helps to maintain your current weight. You can tell that you are exercising at a moderate level if you have a higher heart rate and faster breathing, but you are still able to hold a conversation.  HOW OFTEN SHOULD I EXERCISE?  Choose an activity that you enjoy and set realistic goals. Your health care provider can help you to make an activity plan that works for you. Exercise regularly as directed by your health care provider. This may include:  · Doing resistance training twice each week, such as:    Push-ups.    Sit-ups.    Lifting weights.    Using resistance bands.  · Doing a given intensity of exercise for a given amount of time. Choose from these options:    150 minutes of moderate-intensity exercise every week.    75 minutes of vigorous-intensity exercise every week.    A mix of moderate-intensity and vigorous-intensity exercise every week.  Children, pregnant women, people who are out of shape, people who are overweight, and older adults may need to consult a health care provider for individual recommendations. If you have any sort of medical condition, be sure to consult your health care provider before starting a new exercise program.  WHAT ARE SOME ACTIVITIES THAT CAN HELP ME TO LOSE WEIGHT?   · Walking at a rate of at least 4.5 miles an hour.  · Jogging or running at a rate of 5 miles per hour.  · Biking at a rate of at least 10 miles per hour.  · Lap swimming.  · Roller-skating or in-line skating.  · Cross-country skiing.  · Vigorous competitive sports, such as football, basketball, and soccer.  · Jumping rope.  · Aerobic dancing.  HOW CAN I BE MORE ACTIVE IN MY DAY-TO-DAY ACTIVITIES?  · Use the stairs instead of  the elevator.  · Take a walk during your lunch break.  · If you drive, park your car farther away from work or school.  · If you take public transportation, get off one stop early and walk the rest of the way.  · Make all of your phone calls while standing up and walking around.  · Get up, stretch, and walk around every 30 minutes throughout the day.  WHAT GUIDELINES SHOULD I FOLLOW WHILE EXERCISING?  · Do not exercise so much that you hurt yourself, feel dizzy, or get very short of breath.  · Consult your health care provider prior to starting a new exercise program.  · Wear comfortable clothes and shoes with good support.  · Drink plenty of water while you exercise to prevent dehydration or heat stroke. Body water is lost during exercise and must be replaced.  · Work out until you breathe faster and your heart beats faster.     This information is not intended to replace advice given to you by your health care provider. Make sure you discuss any questions you have with your health care provider.     Document Released: 01/20/2012 Document Revised: 01/08/2016 Document Reviewed: 05/21/2015  [employer] Interactive Patient Education ©2017 [employer]  Calorie Counting for Weight Loss  Calories are energy you get from the things you eat and drink. Your body uses this energy to keep you going throughout the day. The number of calories you eat affects your weight. When you eat more calories than your body needs, your body stores the extra calories as fat. When you eat fewer calories than your body needs, your body burns fat to get the energy it needs.  Calorie counting means keeping track of how many calories you eat and drink each day. If you make sure to eat fewer calories than your body needs, you should lose weight. In order for calorie counting to work, you will need to eat the number of calories that are right for you in a day to lose a healthy amount of weight per week. A healthy amount of weight to lose per week  is usually 1-2 lb (0.5-0.9 kg). A dietitian can determine how many calories you need in a day and give you suggestions on how to reach your calorie goal.   WHAT IS MY MY PLAN?  My goal is to have __________ calories per day.   If I have this many calories per day, I should lose around __________ pounds per week.  WHAT DO I NEED TO KNOW ABOUT CALORIE COUNTING?  In order to meet your daily calorie goal, you will need to:  · Find out how many calories are in each food you would like to eat. Try to do this before you eat.  · Decide how much of the food you can eat.  · Write down what you ate and how many calories it had. Doing this is called keeping a food log.  WHERE DO I FIND CALORIE INFORMATION?  The number of calories in a food can be found on a Nutrition Facts label. Note that all the information on a label is based on a specific serving of the food. If a food does not have a Nutrition Facts label, try to look up the calories online or ask your dietitian for help.  HOW DO I DECIDE HOW MUCH TO EAT?  To decide how much of the food you can eat, you will need to consider both the number of calories in one serving and the size of one serving. This information can be found on the Nutrition Facts label. If a food does not have a Nutrition Facts label, look up the information online or ask your dietitian for help.  Remember that calories are listed per serving. If you choose to have more than one serving of a food, you will have to multiply the calories per serving by the amount of servings you plan to eat. For example, the label on a package of bread might say that a serving size is 1 slice and that there are 90 calories in a serving. If you eat 1 slice, you will have eaten 90 calories. If you eat 2 slices, you will have eaten 180 calories.  HOW DO I KEEP A FOOD LOG?  After each meal, record the following information in your food log:  · What you ate.  · How much of it you ate.  · How many calories it had.  · Then, add up  your calories.  Keep your food log near you, such as in a small notebook in your pocket. Another option is to use a mobile caridad or website. Some programs will calculate calories for you and show you how many calories you have left each time you add an item to the log.  WHAT ARE SOME CALORIE COUNTING TIPS?  · Use your calories on foods and drinks that will fill you up and not leave you hungry. Some examples of this include foods like nuts and nut butters, vegetables, lean proteins, and high-fiber foods (more than 5 g fiber per serving).  · Eat nutritious foods and avoid empty calories. Empty calories are calories you get from foods or beverages that do not have many nutrients, such as candy and soda. It is better to have a nutritious high-calorie food (such as an avocado) than a food with few nutrients (such as a bag of chips).  · Know how many calories are in the foods you eat most often. This way, you do not have to look up how many calories they have each time you eat them.  · Look out for foods that may seem like low-calorie foods but are really high-calorie foods, such as baked goods, soda, and fat-free candy.  · Pay attention to calories in drinks. Drinks such as sodas, specialty coffee drinks, alcohol, and juices have a lot of calories yet do not fill you up. Choose low-calorie drinks like water and diet drinks.  · Focus your calorie counting efforts on higher calorie items. Logging the calories in a garden salad that contains only vegetables is less important than calculating the calories in a milk shake.  · Find a way of tracking calories that works for you. Get creative. Most people who are successful find ways to keep track of how much they eat in a day, even if they do not count every calorie.  WHAT ARE SOME PORTION CONTROL TIPS?  · Know how many calories are in a serving. This will help you know how many servings of a certain food you can have.  · Use a measuring cup to measure serving sizes. This is  "helpful when you start out. With time, you will be able to estimate serving sizes for some foods.  · Take some time to put servings of different foods on your favorite plates, bowls, and cups so you know what a serving looks like.  · Try not to eat straight from a bag or box. Doing this can lead to overeating. Put the amount you would like to eat in a cup or on a plate to make sure you are eating the right portion.  · Use smaller plates, glasses, and bowls to prevent overeating. This is a quick and easy way to practice portion control. If your plate is smaller, less food can fit on it.  · Try not to multitask while eating, such as watching TV or using your computer. If it is time to eat, sit down at a table and enjoy your food. Doing this will help you to start recognizing when you are full. It will also make you more aware of what and how much you are eating.  HOW CAN I CALORIE COUNT WHEN EATING OUT?  · Ask for smaller portion sizes or child-sized portions.  · Consider sharing an entree and sides instead of getting your own entree.  · If you get your own entree, eat only half. Ask for a box at the beginning of your meal and put the rest of your entree in it so you are not tempted to eat it.  · Look for the calories on the menu. If calories are listed, choose the lower calorie options.  · Choose dishes that include vegetables, fruits, whole grains, low-fat dairy products, and lean protein. Focusing on smart food choices from each of the 5 food groups can help you stay on track at restaurants.  · Choose items that are boiled, broiled, grilled, or steamed.  · Choose water, milk, unsweetened iced tea, or other drinks without added sugars. If you want an alcoholic beverage, choose a lower calorie option. For example, a regular franky can have up to 700 calories and a glass of wine has around 150.  · Stay away from items that are buttered, battered, fried, or served with cream sauce. Items labeled \"crispy\" are usually " fried, unless stated otherwise.  · Ask for dressings, sauces, and syrups on the side. These are usually very high in calories, so do not eat much of them.  · Watch out for salads. Many people think salads are a healthy option, but this is often not the case. Many salads come with ledezma, fried chicken, lots of cheese, fried chips, and dressing. All of these items have a lot of calories. If you want a salad, choose a garden salad and ask for grilled meats or steak. Ask for the dressing on the side, or ask for olive oil and vinegar or lemon to use as dressing.  · Estimate how many servings of a food you are given. For example, a serving of cooked rice is ½ cup or about the size of half a tennis ball or one cupcake wrapper. Knowing serving sizes will help you be aware of how much food you are eating at restaurants. The list below tells you how big or small some common portion sizes are based on everyday objects.    1 oz--4 stacked dice.    3 oz--1 deck of cards.    1 tsp--1 dice.    1 Tbsp--½ a Ping-Pong ball.    2 Tbsp--1 Ping-Pong ball.    ½ cup--1 tennis ball or 1 cupcake wrapper.    1 cup--1 baseball.     This information is not intended to replace advice given to you by your health care provider. Make sure you discuss any questions you have with your health care provider.     Document Released: 2006 Document Revised: 2016 Document Reviewed: 10/23/2014  [employer] Interactive Patient Education © [employer]    Medicare Wellness  Personal Prevention Plan of Service     Date of Office Visit:  2017  Encounter Provider:  Lashanda Olvera MD  Place of Service:  [facility]  Patient Name: Lul Garcia  :  1955    As part of the Medicare Wellness portion of your visit today, we are providing you with this personalized preventive plan of services (PPPS). This plan is based upon recommendations of the [place] Preventive Services Task Force  (USPSTF) and the Advisory Committee on Immunization Practices (ACIP).    This lists the preventive care services that should be considered, and provides dates of when you are due. Items listed as completed are up-to-date and do not require any further intervention.    Health Maintenance   Topic Date Due   • INFLUENZA VACCINE  08/01/2017   • LIPID PANEL  06/12/2018   • MEDICARE ANNUAL WELLNESS  06/14/2018   • COLONOSCOPY  05/29/2023   • TDAP/TD VACCINES (2 - Td) 03/15/2025   • HEPATITIS C SCREENING  Completed   • ZOSTER VACCINE  Completed       No orders of the defined types were placed in this encounter.      Return in about 6 months (around 12/14/2017), or if symptoms worsen or fail to improve.

## 2017-06-15 PROBLEM — K59.1 FUNCTIONAL DIARRHEA: Status: ACTIVE | Noted: 2017-06-15

## 2017-06-15 PROBLEM — K21.9 GASTROESOPHAGEAL REFLUX DISEASE WITHOUT ESOPHAGITIS: Status: ACTIVE | Noted: 2017-06-15

## 2017-06-16 DIAGNOSIS — I10 ESSENTIAL HYPERTENSION: ICD-10-CM

## 2017-06-16 RX ORDER — OMEPRAZOLE 20 MG/1
20 CAPSULE, DELAYED RELEASE ORAL DAILY
Qty: 30 CAPSULE | Refills: 5 | Status: SHIPPED | OUTPATIENT
Start: 2017-06-16 | End: 2018-01-20 | Stop reason: SDUPTHER

## 2017-06-16 RX ORDER — PRAVASTATIN SODIUM 20 MG
20 TABLET ORAL DAILY
Qty: 90 TABLET | Refills: 0 | Status: SHIPPED | OUTPATIENT
Start: 2017-06-16 | End: 2017-09-05 | Stop reason: SDUPTHER

## 2017-06-16 RX ORDER — LEVETIRACETAM 500 MG/1
500 TABLET ORAL 2 TIMES DAILY
Qty: 60 TABLET | Refills: 0 | Status: SHIPPED | OUTPATIENT
Start: 2017-06-16 | End: 2017-09-05 | Stop reason: SDUPTHER

## 2017-06-16 RX ORDER — LISINOPRIL 20 MG/1
20 TABLET ORAL DAILY
Qty: 30 TABLET | Refills: 5 | Status: SHIPPED | OUTPATIENT
Start: 2017-06-16 | End: 2018-02-06 | Stop reason: SDUPTHER

## 2017-07-21 ENCOUNTER — OFFICE VISIT (OUTPATIENT)
Dept: FAMILY MEDICINE CLINIC | Facility: CLINIC | Age: 62
End: 2017-07-21

## 2017-07-21 VITALS
SYSTOLIC BLOOD PRESSURE: 128 MMHG | HEIGHT: 68 IN | DIASTOLIC BLOOD PRESSURE: 80 MMHG | TEMPERATURE: 96.4 F | BODY MASS INDEX: 28.64 KG/M2 | WEIGHT: 189 LBS | OXYGEN SATURATION: 97 % | HEART RATE: 81 BPM

## 2017-07-21 DIAGNOSIS — D49.6 BRAIN TUMOR (HCC): ICD-10-CM

## 2017-07-21 DIAGNOSIS — I10 ESSENTIAL HYPERTENSION: Primary | Chronic | ICD-10-CM

## 2017-07-21 DIAGNOSIS — K21.9 GASTROESOPHAGEAL REFLUX DISEASE WITHOUT ESOPHAGITIS: ICD-10-CM

## 2017-07-21 DIAGNOSIS — N52.9 ERECTILE DYSFUNCTION, UNSPECIFIED ERECTILE DYSFUNCTION TYPE: ICD-10-CM

## 2017-07-21 DIAGNOSIS — E66.3 OVERWEIGHT (BMI 25.0-29.9): ICD-10-CM

## 2017-07-21 DIAGNOSIS — K59.1 FUNCTIONAL DIARRHEA: ICD-10-CM

## 2017-07-21 PROCEDURE — 99214 OFFICE O/P EST MOD 30 MIN: CPT | Performed by: FAMILY MEDICINE

## 2017-07-21 RX ORDER — SILDENAFIL 100 MG/1
TABLET, FILM COATED ORAL
Qty: 2 TABLET | Refills: 0 | COMMUNITY
Start: 2017-07-21 | End: 2017-09-05

## 2017-07-21 NOTE — PROGRESS NOTES
Subjective   Chief Complaint   Patient presents with   • Follow-up     6 week follow up   • place on left leg     Lul Garcia is a 62 y.o. male.   Follow-up (6 week follow up) and place on left leg    History of Present Illness     Hyperlipidemia   This is a chronic problem. The problem is uncontrolled. Recent lipid tests were reviewed and are high. Factors aggravating his hyperlipidemia include fatty foods. Pertinent negatives include no chest pain or shortness of breath. Current antihyperlipidemic treatment includes statins and diet change. Compliance problems include adherence to diet.  Risk factors for coronary artery disease include male sex, hypertension, stress and a sedentary lifestyle.   currently prescribed pravastatin and fish oil - patient admits to not taking pravastatin  Last lipid panel done June 2017- total cholesterol and LDL elevated above goal  TG and HDL are good    Hypertension   This is a chronic problem. The current episode started more than 1 year ago. The problem is controlled. The problem is controlled. Pertinent negatives include no blurred vision, chest pain, headaches, neck pain, palpitations or shortness of breath. There are no associated agents to hypertension. Risk factors for coronary artery disease include sedentary lifestyle, male gender, family history and dyslipidemia. Past treatments include ACE inhibitors. The current treatment provides mild improvement. There are no compliance problems.    currently taking lisinopril    H/o brain tumor followed by [facility] every 6 months  Preventative measures for seizures - patient maintained on keppra  Levels are monitored by [facility]  Diagnosed with pre-syncope.  CT nml, cardiac enzymes wnl.  Has had brain surgery four times.  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  Grossly stable size compared to prior studies of 2014 and 2007; prior Right craniotomies; no acute intracranial abnormality evident; Carotid doppler--no physiologically significant elevated flow velocities; mild heterogeneous plaque in the common  carotid bifurcation( reports scanned into chart).     gerd - controlled with prilosec    Functional daily diarrhea - remains uncontrolled  Does not have a diet high in fiber  Does not use fiber supplement    Interested in viagra for ED    The following portions of the patient's history were reviewed and updated as appropriate: allergies, current medications, past family history, past medical history, past social history, past surgical history and problem list.    Review of Systems   Constitutional: Negative for appetite change, chills, fatigue and fever.   HENT: Negative for congestion, ear pain, rhinorrhea and sore throat.    Eyes: Negative for pain.   Respiratory: Negative for cough and shortness of breath.    Cardiovascular: Negative for chest pain and palpitations.   Gastrointestinal: Positive for diarrhea. Negative for abdominal pain, constipation and nausea.   Genitourinary: Negative for dysuria.        Erectile dysfunction   Musculoskeletal: Negative for back pain, joint swelling and neck pain.   Skin: Negative for rash.   Neurological: Negative for dizziness and headaches.       Objective   /80  Pulse 81  Temp 96.4 °F (35.8 °C)  Ht 68\" (172.7 cm)  Wt 189 lb (85.7 kg)  SpO2 97%  BMI 28.74 kg/m2  Physical Exam   Constitutional: He is oriented to person, place, and time. He appears well-developed and well-nourished.   HENT:   Head: Normocephalic and atraumatic.   Eyes: Pupils are equal, round, and reactive to light.   Neck: Normal range of motion. Neck supple.   Cardiovascular: Normal rate, regular rhythm and normal heart sounds.    Pulmonary/Chest: Effort normal and breath sounds normal. No respiratory distress. He has no " wheezes. He has no rales.   Abdominal: Soft. Bowel sounds are normal.   Musculoskeletal: Normal range of motion.   Neurological: He is alert and oriented to person, place, and time.   Skin: Skin is warm and dry.   Psychiatric: He has a normal mood and affect.   Nursing note and vitals reviewed.      Assessment/Plan   Problems Addressed this Visit        Cardiovascular and Mediastinum    Essential hypertension - Primary (Chronic)       Digestive    Gastroesophageal reflux disease without esophagitis    Functional diarrhea       Genitourinary    Erectile dysfunction       Other    Overweight (BMI 25.0-29.9)    Brain tumor        Request of medical records from [place]  Recommended patient to call and recheck on follow up needed    Continue with current medications    Sample of viagra given    Educational handout on diarrhea  Recommended fiber supplement    Recheck in 6 months

## 2017-07-21 NOTE — PATIENT INSTRUCTIONS
Chronic Diarrhea  Diarrhea is frequent loose and watery bowel movements. It can cause you to feel weak and dehydrated. Dehydration can cause you to become tired and thirsty and to have a dry mouth, decreased urination, and dark yellow urine. Diarrhea is a sign of another problem, most often an infection that will not last long. In most cases, diarrhea lasts 2-3 days. Diarrhea that lasts longer than 4 weeks is called long-lasting (chronic) diarrhea. It is important to treat your diarrhea as directed by your health care provider to lessen or prevent future episodes of diarrhea.   CAUSES   There are many causes of chronic diarrhea. The following are some possible causes:   · Gastrointestinal infections caused by viruses, bacteria, or parasites.    · Food poisoning or food allergies.    · Certain medicines, such as antibiotics, chemotherapy, and laxatives.    · Artificial sweeteners and fructose.    · Digestive disorders, such as celiac disease and inflammatory bowel diseases.    · Irritable bowel syndrome.  · Some disorders of the pancreas.  · Disorders of the thyroid.  · Reduced blood flow to the intestines.  · Cancer.  Sometimes the cause of chronic diarrhea is unknown.  RISK FACTORS  · Having a severely weakened immune system, such as from HIV or AIDS.    · Taking certain types of cancer-fighting drugs (such as with chemotherapy) or other medicines.    · Having had a recent organ transplant.    · Having a portion of the stomach or small bowel removed.    · Traveling to countries where food and water supplies are often contaminated.    SYMPTOMS   In addition to frequent, loose stools, diarrhea may cause:   · Cramping.    · Abdominal pain.    · Nausea.    · Fever.  · Fatigue.  · Urgent need to use the bathroom.  · Loss of bowel control.  DIAGNOSIS   Your health care provider must take a careful history and perform a physical exam. Tests given are based on your symptoms and history. Tests may include:   · Blood or  stool tests. Three or more stool samples may be examined. Stool cultures may be used to test for bacteria or parasites.    · X-rays.    · A procedure in which a thin tube is inserted into the mouth or rectum (endoscopy). This allows the health care provider to look inside the intestine.    TREATMENT   · Treatment is aimed at correcting the cause of the diarrhea when possible.  · Diarrhea caused by an infection can often be treated with antibiotic medicines.  · Diarrhea not caused by an infection may require you to take long-term medicine or have surgery. Specific treatment should be discussed with your health care provider.  · If the cause cannot be determined, treatment aims to relieve symptoms and prevent dehydration. Serious health problems can occur if you do not maintain proper fluid levels. Treatment may include:    Taking an oral rehydration solution (ORS).    Not drinking beverages that contain caffeine (such as tea, coffee, and soft drinks).    Not drinking alcohol.    Maintaining well-balanced nutrition to help you recover faster.  HOME CARE INSTRUCTIONS   · Drink enough fluids to keep urine clear or pale yellow. Drink 1 cup (8 oz) of fluid for each diarrhea episode. Avoid fluids that contain simple sugars, fruit juices, whole milk products, and sodas. Hydrate with an ORS. You may purchase the ORS or prepare it at home by mixing the following ingredients together:    ?-? tsp (1.7-3 ? mL) table salt.    ¾ tsp (3 ¾ mL) baking soda.    ? tsp (1.7 mL) salt substitute containing potassium chloride.    1 ? tbsp (20 mL) sugar.    4.2 c (1 L) of water.    · Certain foods and beverages may increase the speed at which food moves through the gastrointestinal (GI) tract. These foods and beverages should be avoided. They include:    Caffeinated and alcoholic beverages.    High-fiber foods, such as raw fruits and vegetables, nuts, seeds, and whole grain breads and cereals.    Foods and beverages sweetened with sugar  alcohols, such as xylitol, sorbitol, and mannitol.    · Some foods may be well tolerated and may help thicken stool. These include:    Starchy foods, such as rice, toast, pasta, low-sugar cereal, oatmeal, grits, baked potatoes, crackers, and bagels.    Bananas.    Applesauce.  · Add probiotic-rich foods to help increase healthy bacteria in the GI tract. These include yogurt and fermented milk products.  · Wash your hands well after each diarrhea episode.  · Only take over-the-counter or prescription medicines as directed by your health care provider.  · Take a warm bath to relieve any burning or pain from frequent diarrhea episodes.  SEEK MEDICAL CARE IF:   · You are not urinating as often.  · Your urine is a dark color.  · You become very tired or dizzy.  · You have severe pain in the abdomen or rectum.  · Your have blood or pus in your stools.  · Your stools look black and tarry.  SEEK IMMEDIATE MEDICAL CARE IF:   · You are unable to keep fluids down.  · You have persistent vomiting.  · You have blood in your stool.  · Your stools are black and tarry.  · You do not urinate in 6-8 hours, or there is only a small amount of very dark urine.  · You have abdominal pain that increases or localizes.  · You have weakness, dizziness, confusion, or lightheadedness.  · You have a severe headache.  · Your diarrhea gets worse or does not get better.  · You have a fever or persistent symptoms for more than 2-3 days.  · You have a fever and your symptoms suddenly get worse.  MAKE SURE YOU:   · Understand these instructions.  · Will watch your condition.  · Will get help right away if you are not doing well or get worse.     This information is not intended to replace advice given to you by your health care provider. Make sure you discuss any questions you have with your health care provider.     Document Released: 03/09/2005 Document Revised: 12/23/2014 Document Reviewed: 06/12/2014  [employer] Interactive Patient Education ©2017  [employer]  Calorie Counting for Weight Loss  Calories are energy you get from the things you eat and drink. Your body uses this energy to keep you going throughout the day. The number of calories you eat affects your weight. When you eat more calories than your body needs, your body stores the extra calories as fat. When you eat fewer calories than your body needs, your body burns fat to get the energy it needs.  Calorie counting means keeping track of how many calories you eat and drink each day. If you make sure to eat fewer calories than your body needs, you should lose weight. In order for calorie counting to work, you will need to eat the number of calories that are right for you in a day to lose a healthy amount of weight per week. A healthy amount of weight to lose per week is usually 1-2 lb (0.5-0.9 kg). A dietitian can determine how many calories you need in a day and give you suggestions on how to reach your calorie goal.   WHAT IS MY MY PLAN?  My goal is to have __________ calories per day.   If I have this many calories per day, I should lose around __________ pounds per week.  WHAT DO I NEED TO KNOW ABOUT CALORIE COUNTING?  In order to meet your daily calorie goal, you will need to:  · Find out how many calories are in each food you would like to eat. Try to do this before you eat.  · Decide how much of the food you can eat.  · Write down what you ate and how many calories it had. Doing this is called keeping a food log.  WHERE DO I FIND CALORIE INFORMATION?  The number of calories in a food can be found on a Nutrition Facts label. Note that all the information on a label is based on a specific serving of the food. If a food does not have a Nutrition Facts label, try to look up the calories online or ask your dietitian for help.  HOW DO I DECIDE HOW MUCH TO EAT?  To decide how much of the food you can eat, you will need to consider both the number of calories in one serving and the size of one  serving. This information can be found on the Nutrition Facts label. If a food does not have a Nutrition Facts label, look up the information online or ask your dietitian for help.  Remember that calories are listed per serving. If you choose to have more than one serving of a food, you will have to multiply the calories per serving by the amount of servings you plan to eat. For example, the label on a package of bread might say that a serving size is 1 slice and that there are 90 calories in a serving. If you eat 1 slice, you will have eaten 90 calories. If you eat 2 slices, you will have eaten 180 calories.  HOW DO I KEEP A FOOD LOG?  After each meal, record the following information in your food log:  · What you ate.  · How much of it you ate.  · How many calories it had.  · Then, add up your calories.  Keep your food log near you, such as in a small notebook in your pocket. Another option is to use a mobile caridad or website. Some programs will calculate calories for you and show you how many calories you have left each time you add an item to the log.  WHAT ARE SOME CALORIE COUNTING TIPS?  · Use your calories on foods and drinks that will fill you up and not leave you hungry. Some examples of this include foods like nuts and nut butters, vegetables, lean proteins, and high-fiber foods (more than 5 g fiber per serving).  · Eat nutritious foods and avoid empty calories. Empty calories are calories you get from foods or beverages that do not have many nutrients, such as candy and soda. It is better to have a nutritious high-calorie food (such as an avocado) than a food with few nutrients (such as a bag of chips).  · Know how many calories are in the foods you eat most often. This way, you do not have to look up how many calories they have each time you eat them.  · Look out for foods that may seem like low-calorie foods but are really high-calorie foods, such as baked goods, soda, and fat-free candy.  · Pay attention  to calories in drinks. Drinks such as sodas, specialty coffee drinks, alcohol, and juices have a lot of calories yet do not fill you up. Choose low-calorie drinks like water and diet drinks.  · Focus your calorie counting efforts on higher calorie items. Logging the calories in a garden salad that contains only vegetables is less important than calculating the calories in a milk shake.  · Find a way of tracking calories that works for you. Get creative. Most people who are successful find ways to keep track of how much they eat in a day, even if they do not count every calorie.  WHAT ARE SOME PORTION CONTROL TIPS?  · Know how many calories are in a serving. This will help you know how many servings of a certain food you can have.  · Use a measuring cup to measure serving sizes. This is helpful when you start out. With time, you will be able to estimate serving sizes for some foods.  · Take some time to put servings of different foods on your favorite plates, bowls, and cups so you know what a serving looks like.  · Try not to eat straight from a bag or box. Doing this can lead to overeating. Put the amount you would like to eat in a cup or on a plate to make sure you are eating the right portion.  · Use smaller plates, glasses, and bowls to prevent overeating. This is a quick and easy way to practice portion control. If your plate is smaller, less food can fit on it.  · Try not to multitask while eating, such as watching TV or using your computer. If it is time to eat, sit down at a table and enjoy your food. Doing this will help you to start recognizing when you are full. It will also make you more aware of what and how much you are eating.  HOW CAN I CALORIE COUNT WHEN EATING OUT?  · Ask for smaller portion sizes or child-sized portions.  · Consider sharing an entree and sides instead of getting your own entree.  · If you get your own entree, eat only half. Ask for a box at the beginning of your meal and put the  "rest of your entree in it so you are not tempted to eat it.  · Look for the calories on the menu. If calories are listed, choose the lower calorie options.  · Choose dishes that include vegetables, fruits, whole grains, low-fat dairy products, and lean protein. Focusing on smart food choices from each of the 5 food groups can help you stay on track at restaurants.  · Choose items that are boiled, broiled, grilled, or steamed.  · Choose water, milk, unsweetened iced tea, or other drinks without added sugars. If you want an alcoholic beverage, choose a lower calorie option. For example, a regular franky can have up to 700 calories and a glass of wine has around 150.  · Stay away from items that are buttered, battered, fried, or served with cream sauce. Items labeled \"crispy\" are usually fried, unless stated otherwise.  · Ask for dressings, sauces, and syrups on the side. These are usually very high in calories, so do not eat much of them.  · Watch out for salads. Many people think salads are a healthy option, but this is often not the case. Many salads come with ledezma, fried chicken, lots of cheese, fried chips, and dressing. All of these items have a lot of calories. If you want a salad, choose a garden salad and ask for grilled meats or steak. Ask for the dressing on the side, or ask for olive oil and vinegar or lemon to use as dressing.  · Estimate how many servings of a food you are given. For example, a serving of cooked rice is ½ cup or about the size of half a tennis ball or one cupcake wrapper. Knowing serving sizes will help you be aware of how much food you are eating at restaurants. The list below tells you how big or small some common portion sizes are based on everyday objects.    1 oz--4 stacked dice.    3 oz--1 deck of cards.    1 tsp--1 dice.    1 Tbsp--½ a Ping-Pong ball.    2 Tbsp--1 Ping-Pong ball.    ½ cup--1 tennis ball or 1 cupcake wrapper.    1 cup--1 baseball.     This information is not " intended to replace advice given to you by your health care provider. Make sure you discuss any questions you have with your health care provider.     Document Released: 12/18/2006 Document Revised: 01/08/2016 Document Reviewed: 10/23/2014  [employer] Interactive Patient Education ©2017 [employer]

## 2017-09-05 RX ORDER — DOCUSATE SODIUM 100 MG/1
100 CAPSULE, LIQUID FILLED ORAL 2 TIMES DAILY
Qty: 180 CAPSULE | Refills: 1 | Status: SHIPPED | OUTPATIENT
Start: 2017-09-05

## 2017-09-05 RX ORDER — LEVETIRACETAM 500 MG/1
500 TABLET ORAL 2 TIMES DAILY
Qty: 180 TABLET | Refills: 1 | Status: SHIPPED | OUTPATIENT
Start: 2017-09-05 | End: 2018-02-06 | Stop reason: SDUPTHER

## 2017-09-05 RX ORDER — PRAVASTATIN SODIUM 20 MG
20 TABLET ORAL DAILY
Qty: 90 TABLET | Refills: 1 | Status: SHIPPED | OUTPATIENT
Start: 2017-09-05 | End: 2018-02-06 | Stop reason: SDUPTHER

## 2017-10-10 ENCOUNTER — OFFICE VISIT (OUTPATIENT)
Dept: FAMILY MEDICINE CLINIC | Facility: CLINIC | Age: 62
End: 2017-10-10

## 2017-10-10 VITALS
BODY MASS INDEX: 27.43 KG/M2 | HEIGHT: 68 IN | HEART RATE: 94 BPM | OXYGEN SATURATION: 96 % | WEIGHT: 181 LBS | DIASTOLIC BLOOD PRESSURE: 86 MMHG | TEMPERATURE: 96.5 F | SYSTOLIC BLOOD PRESSURE: 146 MMHG

## 2017-10-10 DIAGNOSIS — J06.9 UPPER RESPIRATORY TRACT INFECTION, UNSPECIFIED TYPE: Primary | ICD-10-CM

## 2017-10-10 PROCEDURE — 96372 THER/PROPH/DIAG INJ SC/IM: CPT | Performed by: FAMILY MEDICINE

## 2017-10-10 PROCEDURE — 99213 OFFICE O/P EST LOW 20 MIN: CPT | Performed by: FAMILY MEDICINE

## 2017-10-10 RX ORDER — GUAIFENESIN AND DEXTROMETHORPHAN HYDROBROMIDE 600; 30 MG/1; MG/1
1 TABLET, EXTENDED RELEASE ORAL 2 TIMES DAILY
Qty: 14 TABLET | Refills: 0 | Status: SHIPPED | OUTPATIENT
Start: 2017-10-10 | End: 2017-10-13

## 2017-10-10 RX ORDER — TRIAMCINOLONE ACETONIDE 40 MG/ML
40 INJECTION, SUSPENSION INTRA-ARTICULAR; INTRAMUSCULAR ONCE
Status: COMPLETED | OUTPATIENT
Start: 2017-10-10 | End: 2017-10-10

## 2017-10-10 RX ADMIN — TRIAMCINOLONE ACETONIDE 40 MG: 40 INJECTION, SUSPENSION INTRA-ARTICULAR; INTRAMUSCULAR at 15:04

## 2017-10-10 NOTE — PROGRESS NOTES
"Subjective   Chief Complaint   Patient presents with   • Nasal Congestion   • Cough     productive     Lul Garcia is a 62 y.o. male.   Nasal Congestion and Cough (productive)    Cough   This is a new problem. The current episode started in the past 7 days. The problem has been gradually worsening. The problem occurs hourly. The cough is productive of sputum. Associated symptoms include nasal congestion, postnasal drip, rhinorrhea, a sore throat, shortness of breath and wheezing. Pertinent negatives include no ear congestion, ear pain or headaches. Treatments tried: decongestant.      The following portions of the patient's history were reviewed and updated as appropriate: allergies, current medications, past family history, past medical history, past social history, past surgical history and problem list.    Review of Systems   HENT: Positive for postnasal drip, rhinorrhea and sore throat. Negative for ear pain.    Respiratory: Positive for cough, shortness of breath and wheezing.    Neurological: Negative for headaches.       Objective   /86  Pulse 94  Temp 96.5 °F (35.8 °C)  Ht 68\" (172.7 cm)  Wt 181 lb (82.1 kg)  SpO2 96%  BMI 27.52 kg/m2  Physical Exam   Constitutional: He is oriented to person, place, and time. He appears well-developed and well-nourished.   HENT:   Head: Normocephalic and atraumatic.   Right Ear: External ear normal.   Left Ear: External ear normal.   Postnasal drainage  Mild erythema   Eyes: Pupils are equal, round, and reactive to light.   Neck: Normal range of motion. Neck supple.   Cardiovascular: Normal rate, regular rhythm and normal heart sounds.    Pulmonary/Chest: Effort normal and breath sounds normal. No respiratory distress. He has no wheezes. He has no rales.   Inhalation produces productive cough   Abdominal: Soft. Bowel sounds are normal.   Musculoskeletal: Normal range of motion.   Neurological: He is alert and oriented to person, place, and time.   Skin: " Skin is warm and dry.   Psychiatric: He has a normal mood and affect.   Nursing note and vitals reviewed.      Assessment/Plan   Problems Addressed this Visit     None      Visit Diagnoses     Upper respiratory tract infection, unspecified type    -  Primary    Relevant Medications    triamcinolone acetonide (KENALOG-40) injection 40 mg (Start on 10/10/2017  3:30 PM)    guaifenesin-dextromethorphan (MUCINEX DM)  MG tablet sustained-release 12 hour tablet        Kenalog IM today    mucinex dm for cough and congestion    Plenty of fluids    Call if no improvement    Recheck as needed

## 2017-10-13 RX ORDER — AZITHROMYCIN 250 MG/1
TABLET, FILM COATED ORAL
Qty: 6 TABLET | Refills: 0 | Status: SHIPPED | OUTPATIENT
Start: 2017-10-13 | End: 2017-10-26

## 2017-10-13 RX ORDER — BENZONATATE 100 MG/1
100 CAPSULE ORAL 3 TIMES DAILY PRN
Qty: 30 CAPSULE | Refills: 0 | Status: SHIPPED | OUTPATIENT
Start: 2017-10-13 | End: 2017-10-26

## 2018-01-22 RX ORDER — OMEPRAZOLE 20 MG/1
CAPSULE, DELAYED RELEASE ORAL
Qty: 30 CAPSULE | Refills: 5 | Status: SHIPPED | OUTPATIENT
Start: 2018-01-22 | End: 2018-07-12 | Stop reason: SDUPTHER

## 2018-01-23 RX ORDER — BROMPHENIRAMINE MALEATE, PSEUDOEPHEDRINE HYDROCHLORIDE, AND DEXTROMETHORPHAN HYDROBROMIDE 2; 30; 10 MG/5ML; MG/5ML; MG/5ML
5 SYRUP ORAL 4 TIMES DAILY PRN
Qty: 118 ML | Refills: 0 | Status: SHIPPED | OUTPATIENT
Start: 2018-01-23 | End: 2018-06-25

## 2018-02-06 DIAGNOSIS — I10 ESSENTIAL HYPERTENSION: ICD-10-CM

## 2018-02-06 RX ORDER — LEVETIRACETAM 500 MG/1
500 TABLET ORAL 2 TIMES DAILY
Qty: 180 TABLET | Refills: 1 | Status: SHIPPED | OUTPATIENT
Start: 2018-02-06

## 2018-02-06 RX ORDER — PRAVASTATIN SODIUM 20 MG
20 TABLET ORAL DAILY
Qty: 90 TABLET | Refills: 1 | Status: SHIPPED | OUTPATIENT
Start: 2018-02-06 | End: 2018-10-20 | Stop reason: SDUPTHER

## 2018-02-06 RX ORDER — LISINOPRIL 20 MG/1
20 TABLET ORAL DAILY
Qty: 30 TABLET | Refills: 5 | Status: SHIPPED | OUTPATIENT
Start: 2018-02-06 | End: 2018-07-27 | Stop reason: SDUPTHER

## 2018-02-06 RX ORDER — LEVETIRACETAM 500 MG/1
500 TABLET ORAL DAILY
Qty: 180 TABLET | Refills: 1 | Status: SHIPPED | OUTPATIENT
Start: 2018-02-06 | End: 2018-02-06

## 2018-06-21 DIAGNOSIS — B02.29 HZV (HERPES ZOSTER VIRUS) POST HERPETIC NEURALGIA: ICD-10-CM

## 2018-06-25 ENCOUNTER — OFFICE VISIT (OUTPATIENT)
Dept: FAMILY MEDICINE CLINIC | Facility: CLINIC | Age: 63
End: 2018-06-25

## 2018-06-25 VITALS
HEIGHT: 68 IN | OXYGEN SATURATION: 98 % | DIASTOLIC BLOOD PRESSURE: 102 MMHG | BODY MASS INDEX: 28.04 KG/M2 | SYSTOLIC BLOOD PRESSURE: 152 MMHG | WEIGHT: 185 LBS | TEMPERATURE: 97.4 F | HEART RATE: 81 BPM

## 2018-06-25 DIAGNOSIS — B02.29 HZV (HERPES ZOSTER VIRUS) POST HERPETIC NEURALGIA: Primary | ICD-10-CM

## 2018-06-25 PROCEDURE — 99213 OFFICE O/P EST LOW 20 MIN: CPT | Performed by: FAMILY MEDICINE

## 2018-06-25 PROCEDURE — 96372 THER/PROPH/DIAG INJ SC/IM: CPT | Performed by: FAMILY MEDICINE

## 2018-06-25 RX ORDER — GABAPENTIN 300 MG/1
300 CAPSULE ORAL 3 TIMES DAILY
Qty: 90 CAPSULE | Refills: 0 | Status: SHIPPED | OUTPATIENT
Start: 2018-06-25

## 2018-06-25 RX ORDER — TRIAMCINOLONE ACETONIDE 40 MG/ML
80 INJECTION, SUSPENSION INTRA-ARTICULAR; INTRAMUSCULAR ONCE
Status: COMPLETED | OUTPATIENT
Start: 2018-06-25 | End: 2018-06-25

## 2018-06-25 RX ADMIN — TRIAMCINOLONE ACETONIDE 80 MG: 40 INJECTION, SUSPENSION INTRA-ARTICULAR; INTRAMUSCULAR at 08:28

## 2018-06-25 NOTE — PATIENT INSTRUCTIONS
Neuropathic Pain  Neuropathic pain is pain caused by damage to the nerves that are responsible for certain sensations in your body (sensory nerves). The pain can be caused by damage to:  · The sensory nerves that send signals to your spinal cord and brain (peripheral nervous system).  · The sensory nerves in your brain or spinal cord (central nervous system).    Neuropathic pain can make you more sensitive to pain. What would be a minor sensation for most people may feel very painful if you have neuropathic pain. This is usually a long-term condition that can be difficult to treat. The type of pain can differ from person to person. It may start suddenly (acute), or it may develop slowly and last for a long time (chronic). Neuropathic pain may come and go as damaged nerves heal or may stay at the same level for years. It often causes emotional distress, loss of sleep, and a lower quality of life.  What are the causes?  The most common cause of damage to a sensory nerve is diabetes. Many other diseases and conditions can also cause neuropathic pain. Causes of neuropathic pain can be classified as:  · Toxic. Many drugs and chemicals can cause toxic damage. The most common cause of toxic neuropathic pain is damage from drug treatment for cancer (chemotherapy).  · Metabolic. This type of pain can happen when a disease causes imbalances that damage nerves. Diabetes is the most common of these diseases. Vitamin B deficiency caused by long-term alcohol abuse is another common cause.  · Traumatic. Any injury that cuts, crushes, or stretches a nerve can cause damage and pain. A common example is feeling pain after losing an arm or leg (phantom limb pain).  · Compression-related. If a sensory nerve gets trapped or compressed for a long period of time, the blood supply to the nerve can be cut off.  · Vascular. Many blood vessel diseases can cause neuropathic pain by decreasing blood supply and oxygen to nerves.  · Autoimmune.  This type of pain results from diseases in which the body’s defense system mistakenly attacks sensory nerves. Examples of autoimmune diseases that can cause neuropathic pain include lupus and multiple sclerosis.  · Infectious. Many types of viral infections can damage sensory nerves and cause pain. Shingles infection is a common cause of this type of pain.  · Inherited. Neuropathic pain can be a symptom of many diseases that are passed down through families (genetic).    What are the signs or symptoms?  The main symptom is pain. Neuropathic pain is often described as:  · Burning.  · Shock-like.  · Stinging.  · Hot or cold.  · Itching.    How is this diagnosed?  No single test can diagnose neuropathic pain. Your health care provider will do a physical exam and ask you about your pain. You may use a pain scale to describe how bad your pain is. You may also have tests to see if you have a high sensitivity to pain and to help find the cause and location of any sensory nerve damage. These tests may include:  · Imaging studies, such as:  ? X-rays.  ? CT scan.  ? MRI.  · Nerve conduction studies to test how well nerve signals travel through your sensory nerves (electrodiagnostic testing).  · Stimulating your sensory nerves through electrodes on your skin and measuring the response in your spinal cord and brain (somatosensory evoked potentials).    How is this treated?  Treatment for neuropathic pain may change over time. You may need to try different treatment options or a combination of treatments. Some options include:  · Over-the-counter pain relievers.  · Prescription medicines. Some medicines used to treat other conditions may also help neuropathic pain. These include medicines to:  ? Control seizures (anticonvulsants).  ? Relieve depression (antidepressants).  · Prescription-strength pain relievers (narcotics). These are usually used when other pain relievers do not help.  · Transcutaneous nerve stimulation (TENS).  This uses electrical currents to block painful nerve signals. The treatment is painless.  · Topical and local anesthetics. These are medicines that numb the nerves. They can be injected as a nerve block or applied to the skin.  · Alternative treatments, such as:  ? Acupuncture.  ? Meditation.  ? Massage.  ? Physical therapy.  ? Pain management programs.  ? Counseling.    Follow these instructions at home:  · Learn as much as you can about your condition.  · Take medicines only as directed by your health care provider.  · Work closely with all your health care providers to find what works best for you.  · Have a good support system at home.  · Consider joining a chronic pain support group.  Contact a health care provider if:  · Your pain treatments are not helping.  · You are having side effects from your medicines.  · You are struggling with fatigue, mood changes, depression, or anxiety.  This information is not intended to replace advice given to you by your health care provider. Make sure you discuss any questions you have with your health care provider.  Document Released: 09/14/2005 Document Revised: 07/07/2017 Document Reviewed: 05/28/2015  [employer] Interactive Patient Education © 2018 [employer]    Shingles  Shingles is an infection that causes a painful skin rash and fluid-filled blisters. Shingles is caused by the same virus that causes chickenpox.  Shingles only develops in people who:  · Have had chickenpox.  · Have gotten the chickenpox vaccine. (This is rare.)    The first symptoms of shingles may be itching, tingling, or pain in an area on your skin. A rash will follow in a few days or weeks. The rash is usually on one side of the body in a bandlike or beltlike pattern. Over time, the rash turns into fluid-filled blisters that break open, scab over, and dry up. Medicines may:  · Help you manage pain.  · Help you recover more quickly.  · Help to prevent long-term problems.    Follow these instructions  at home:  Medicines  · Take medicines only as told by your doctor.  · Apply an anti-itch or numbing cream to the affected area as told by your doctor.  Blister and Rash Care  · Take a cool bath or put cool compresses on the area of the rash or blisters as told by your doctor. This may help with pain and itching.  · Keep your rash covered with a loose bandage (dressing). Wear loose-fitting clothing.  · Keep your rash and blisters clean with mild soap and cool water or as told by your doctor.  · Check your rash every day for signs of infection. These include redness, swelling, and pain that lasts or gets worse.  · Do not pick your blisters.  · Do not scratch your rash.  General instructions  · Rest as told by your doctor.  · Keep all follow-up visits as told by your doctor. This is important.  · Until your blisters scab over, your infection can cause chickenpox in people who have never had it or been vaccinated against it. To prevent this from happening, avoid touching other people or being around other people, especially:  ? Babies.  ? Pregnant women.  ? Children who have eczema.  ? Elderly people who have transplants.  ? People who have chronic illnesses, such as leukemia or AIDS.  Contact a doctor if:  · Your pain does not get better with medicine.  · Your pain does not get better after the rash heals.  · Your rash looks infected. Signs of infection include:  ? Redness.  ? Swelling.  ? Pain that lasts or gets worse.  Get help right away if:  · The rash is on your face or nose.  · You have pain in your face, pain around your eye area, or loss of feeling on one side of your face.  · You have ear pain or you have ringing in your ear.  · You have loss of taste.  · Your condition gets worse.  This information is not intended to replace advice given to you by your health care provider. Make sure you discuss any questions you have with your health care provider.  Document Released: 06/05/2009 Document Revised: 08/13/2017  Document Reviewed: 09/29/2015  [employer] Interactive Patient Education © 2018 [employer]

## 2018-06-25 NOTE — PROGRESS NOTES
"Subjective   Chief Complaint   Patient presents with   • Pain     poss shingles on abdomen/back since memorial day     Lul Garcia is a 63 y.o. male.   Pain (poss shingles on abdomen/back since memorial day)    History of Present Illness     Complaints of shingles outbreak neuropathic pain that has lingered since before May  He has been treated by urgent care several times  Has been provided valacyclovir on 5/29/18  Was then treated with xanax, lidocaine 2% topical BID  Due to history of brain tumor - choosing safe medications to use for patient  Complaints of pain, insomnia    The following portions of the patient's history were reviewed and updated as appropriate: allergies, current medications, past family history, past medical history, past social history, past surgical history and problem list.    Review of Systems   Skin: Positive for rash.   Neurological:        Burning, neuropathic pain   Psychiatric/Behavioral: Positive for sleep disturbance.       Objective   BP (!) 152/102   Pulse 81   Temp 97.4 °F (36.3 °C) (Oral)   Ht 172.7 cm (68\")   Wt 83.9 kg (185 lb)   SpO2 98%   BMI 28.13 kg/m²   Physical Exam   Constitutional: He is oriented to person, place, and time. He appears well-developed and well-nourished.   HENT:   Head: Normocephalic and atraumatic.   Eyes: Pupils are equal, round, and reactive to light.   Neck: Normal range of motion. Neck supple.   Cardiovascular: Normal rate, regular rhythm and normal heart sounds.    Pulmonary/Chest: Effort normal and breath sounds normal. No respiratory distress. He has no wheezes. He has no rales.   Abdominal: Soft. Bowel sounds are normal.   Musculoskeletal: Normal range of motion.   Neurological: He is alert and oriented to person, place, and time.   Skin: Skin is warm and dry. Rash noted.        Psychiatric: He has a normal mood and affect.   Nursing note and vitals reviewed.      Assessment/Plan   Problems Addressed this Visit     None      Visit " Diagnoses     HZV (herpes zoster virus) post herpetic neuralgia    -  Primary    Relevant Medications    gabapentin (NEURONTIN) 300 MG capsule    lidocaine (XYLOCAINE) 2 % jelly    triamcinolone acetonide (KENALOG-40) injection 80 mg (Start on 6/25/2018  9:00 AM)        Start neurontin  Cautioned on use and educated on side effects  No listed contraindication for health history    Refilled lidocaine topical gel    Educational handout provided for home care    Recheck as needed

## 2018-07-12 RX ORDER — OMEPRAZOLE 20 MG/1
CAPSULE, DELAYED RELEASE ORAL
Qty: 30 CAPSULE | Refills: 5 | Status: SHIPPED | OUTPATIENT
Start: 2018-07-12

## 2018-07-27 DIAGNOSIS — I10 ESSENTIAL HYPERTENSION: ICD-10-CM

## 2018-07-27 RX ORDER — LISINOPRIL 20 MG/1
20 TABLET ORAL DAILY
Qty: 30 TABLET | Refills: 5 | Status: SHIPPED | OUTPATIENT
Start: 2018-07-27

## 2018-10-22 RX ORDER — PRAVASTATIN SODIUM 20 MG
TABLET ORAL
Qty: 90 TABLET | Refills: 1 | Status: SHIPPED | OUTPATIENT
Start: 2018-10-22

## 2018-11-08 ENCOUNTER — CLINICAL SUPPORT (OUTPATIENT)
Dept: FAMILY MEDICINE CLINIC | Facility: CLINIC | Age: 63
End: 2018-11-08

## 2018-11-08 DIAGNOSIS — Z23 NEED FOR INFLUENZA VACCINATION: Primary | ICD-10-CM

## 2018-11-08 PROCEDURE — G0008 ADMIN INFLUENZA VIRUS VAC: HCPCS | Performed by: FAMILY MEDICINE

## 2018-11-08 PROCEDURE — 90674 CCIIV4 VAC NO PRSV 0.5 ML IM: CPT | Performed by: FAMILY MEDICINE

## 2019-10-18 ENCOUNTER — TELEPHONE (OUTPATIENT)
Dept: CASE MANAGEMENT | Facility: OTHER | Age: 64
End: 2019-10-18

## 2019-10-18 NOTE — TELEPHONE ENCOUNTER
Contacted patient to discuss scheduling Medicare Annual Wellness Visit.  Patient declined scheduling at this time.